# Patient Record
Sex: MALE | Race: ASIAN | NOT HISPANIC OR LATINO | Employment: UNEMPLOYED | URBAN - METROPOLITAN AREA
[De-identification: names, ages, dates, MRNs, and addresses within clinical notes are randomized per-mention and may not be internally consistent; named-entity substitution may affect disease eponyms.]

---

## 2021-07-27 ENCOUNTER — OFFICE VISIT (OUTPATIENT)
Dept: FAMILY MEDICINE | Facility: CLINIC | Age: 51
End: 2021-07-27
Payer: COMMERCIAL

## 2021-07-27 VITALS
RESPIRATION RATE: 14 BRPM | HEIGHT: 66 IN | WEIGHT: 147 LBS | DIASTOLIC BLOOD PRESSURE: 62 MMHG | TEMPERATURE: 96.4 F | SYSTOLIC BLOOD PRESSURE: 112 MMHG | HEART RATE: 82 BPM | OXYGEN SATURATION: 99 % | BODY MASS INDEX: 23.63 KG/M2

## 2021-07-27 DIAGNOSIS — Z12.11 SPECIAL SCREENING FOR MALIGNANT NEOPLASMS, COLON: ICD-10-CM

## 2021-07-27 DIAGNOSIS — Z00.00 HEALTHCARE MAINTENANCE: Primary | ICD-10-CM

## 2021-07-27 DIAGNOSIS — R74.01 TRANSAMINITIS: ICD-10-CM

## 2021-07-27 DIAGNOSIS — Z12.5 SCREENING FOR PROSTATE CANCER: ICD-10-CM

## 2021-07-27 LAB
ERYTHROCYTE [DISTWIDTH] IN BLOOD BY AUTOMATED COUNT: 16.6 % (ref 10–15)
HCT VFR BLD AUTO: 45.3 % (ref 40–53)
HGB BLD-MCNC: 15.3 G/DL (ref 13.3–17.7)
MCH RBC QN AUTO: 22.4 PG (ref 26.5–33)
MCHC RBC AUTO-ENTMCNC: 33.8 G/DL (ref 31.5–36.5)
MCV RBC AUTO: 66 FL (ref 78–100)
PLATELET # BLD AUTO: 247 10E3/UL (ref 150–450)
RBC # BLD AUTO: 6.82 10E6/UL (ref 4.4–5.9)
WBC # BLD AUTO: 8.3 10E3/UL (ref 4–11)

## 2021-07-27 PROCEDURE — 80061 LIPID PANEL: CPT | Performed by: FAMILY MEDICINE

## 2021-07-27 PROCEDURE — 99213 OFFICE O/P EST LOW 20 MIN: CPT | Mod: 25 | Performed by: FAMILY MEDICINE

## 2021-07-27 PROCEDURE — 85027 COMPLETE CBC AUTOMATED: CPT | Performed by: FAMILY MEDICINE

## 2021-07-27 PROCEDURE — 36415 COLL VENOUS BLD VENIPUNCTURE: CPT | Performed by: FAMILY MEDICINE

## 2021-07-27 PROCEDURE — 80053 COMPREHEN METABOLIC PANEL: CPT | Performed by: FAMILY MEDICINE

## 2021-07-27 PROCEDURE — G0103 PSA SCREENING: HCPCS | Performed by: FAMILY MEDICINE

## 2021-07-27 PROCEDURE — 99386 PREV VISIT NEW AGE 40-64: CPT | Performed by: FAMILY MEDICINE

## 2021-07-27 ASSESSMENT — ENCOUNTER SYMPTOMS
HEMATURIA: 0
PARESTHESIAS: 0
HEADACHES: 0
COUGH: 0
PALPITATIONS: 0
WEAKNESS: 0
DIZZINESS: 0
HEMATOCHEZIA: 0
CONSTIPATION: 0
SHORTNESS OF BREATH: 0
NAUSEA: 0
DYSURIA: 0
JOINT SWELLING: 0
EYE PAIN: 0
ABDOMINAL PAIN: 0
MYALGIAS: 0
ARTHRALGIAS: 0
FREQUENCY: 0
FEVER: 0
HEARTBURN: 0
SORE THROAT: 0
CHILLS: 0
NERVOUS/ANXIOUS: 0
DIARRHEA: 0

## 2021-07-27 ASSESSMENT — MIFFLIN-ST. JEOR: SCORE: 1466.79

## 2021-07-27 ASSESSMENT — PAIN SCALES - GENERAL: PAINLEVEL: NO PAIN (0)

## 2021-07-27 NOTE — PROGRESS NOTES
SUBJECTIVE:   CC: Kendall Agosto is an 51 year old male who presents for preventative health visit.       Patient has been advised of split billing requirements and indicates understanding: Yes  Healthy Habits:     Getting at least 3 servings of Calcium per day:  Yes    Bi-annual eye exam:  Yes    Dental care twice a year:  NO    Sleep apnea or symptoms of sleep apnea:  None    Diet:  Other    Frequency of exercise:  2-3 days/week    Duration of exercise:  Less than 15 minutes    Taking medications regularly:  Yes    Medication side effects:  None    PHQ-2 Total Score: 0    Additional concerns today:  No      Today's PHQ-2 Score:   PHQ-2 ( 1999 Pfizer) 7/27/2021   Q1: Little interest or pleasure in doing things 0   Q2: Feeling down, depressed or hopeless 0   PHQ-2 Score 0   Q1: Little interest or pleasure in doing things Not at all   Q2: Feeling down, depressed or hopeless Not at all   PHQ-2 Score 0       Abuse: Current or Past(Physical, Sexual or Emotional)- No  Do you feel safe in your environment? Yes    Have you ever done Advance Care Planning? (For example, a Health Directive, POLST, or a discussion with a medical provider or your loved ones about your wishes): No, advance care planning information given to patient to review.  Patient plans to discuss their wishes with loved ones or provider.      Social History     Tobacco Use     Smoking status: Not on file   Substance Use Topics     Alcohol use: Not on file     If you drink alcohol do you typically have >3 drinks per day or >7 drinks per week? Not applicable    Alcohol Use 7/27/2021   Prescreen: >3 drinks/day or >7 drinks/week? No       Last PSA: No results found for: PSA    Reviewed orders with patient. Reviewed health maintenance and updated orders accordingly - Yes  BP Readings from Last 3 Encounters:   07/27/21 112/62    Wt Readings from Last 3 Encounters:   07/27/21 66.7 kg (147 lb)                  There is no problem list on file for this patient.    No  past surgical history on file.    Social History     Tobacco Use     Smoking status: Current Every Day Smoker     Packs/day: 0.50     Smokeless tobacco: Never Used   Substance Use Topics     Alcohol use: Yes     No family history on file.      No current outpatient medications on file.     No Known Allergies  No lab results found.     Reviewed and updated as needed this visit by clinical staff                 Reviewed and updated as needed this visit by Provider                No past medical history on file.   No past surgical history on file.    Review of Systems   Constitutional: Negative for chills and fever.   HENT: Negative for congestion, ear pain, hearing loss and sore throat.    Eyes: Negative for pain and visual disturbance.   Respiratory: Negative for cough and shortness of breath.    Cardiovascular: Negative for chest pain, palpitations and peripheral edema.   Gastrointestinal: Negative for abdominal pain, constipation, diarrhea, heartburn, hematochezia and nausea.   Genitourinary: Negative for dysuria, frequency, genital sores, hematuria and urgency.   Musculoskeletal: Negative for arthralgias, joint swelling and myalgias.   Skin: Negative for rash.   Neurological: Negative for dizziness, weakness, headaches and paresthesias.   Psychiatric/Behavioral: Negative for mood changes. The patient is not nervous/anxious.      CONSTITUTIONAL: NEGATIVE for fever, chills, change in weight  INTEGUMENTARY/SKIN: NEGATIVE for worrisome rashes, moles or lesions  EYES: NEGATIVE for vision changes or irritation  ENT: NEGATIVE for ear, mouth and throat problems  RESP: NEGATIVE for significant cough or SOB  CV: NEGATIVE for chest pain, palpitations or peripheral edema  GI: NEGATIVE for nausea, abdominal pain, heartburn, or change in bowel habits   male: negative for dysuria, hematuria, decreased urinary stream, erectile dysfunction, urethral discharge  MUSCULOSKELETAL: NEGATIVE for significant arthralgias or  "myalgia  NEURO: NEGATIVE for weakness, dizziness or paresthesias  PSYCHIATRIC: NEGATIVE for changes in mood or affect    OBJECTIVE:   /62   Pulse 82   Temp (!) 96.4  F (35.8  C) (Tympanic)   Resp 14   Ht 1.688 m (5' 6.46\")   Wt 66.7 kg (147 lb)   SpO2 99%   BMI 23.40 kg/m      Physical Exam  GENERAL: healthy, alert and no distress  EYES: Eyes grossly normal to inspection, PERRL and conjunctivae and sclerae normal  HENT: ear canals and TM's normal, nose and mouth without ulcers or lesions  NECK: no adenopathy, no asymmetry, masses, or scars and thyroid normal to palpation  RESP: lungs clear to auscultation - no rales, rhonchi or wheezes  CV: regular rate and rhythm, normal S1 S2, no S3 or S4, no murmur, click or rub, no peripheral edema and peripheral pulses strong  ABDOMEN: soft, nontender, no hepatosplenomegaly, no masses and bowel sounds normal  MS: no gross musculoskeletal defects noted, no edema  SKIN: no suspicious lesions or rashes  NEURO: Normal strength and tone, mentation intact and speech normal  BACK: no CVA tenderness, no paralumbar tenderness  PSYCH: mentation appears normal, affect normal/bright  LYMPH: no cervical, supraclavicular, axillary, or inguinal adenopathy        ASSESSMENT/PLAN:       ICD-10-CM    1. Healthcare maintenance  Z00.00 CBC with platelets     Comprehensive metabolic panel (BMP + Alb, Alk Phos, ALT, AST, Total. Bili, TP)     Lipid panel reflex to direct LDL Fasting     PSA, screen     Adult Gastro Ref - Procedure Only     CBC with platelets     Comprehensive metabolic panel (BMP + Alb, Alk Phos, ALT, AST, Total. Bili, TP)     Lipid panel reflex to direct LDL Fasting     PSA, screen   2. Screening for prostate cancer  Z12.5 PSA, screen     PSA, screen   3. Special screening for malignant neoplasms, colon  Z12.11 Adult Gastro Ref - Procedure Only   4. Transaminitis  R74.01 US Abdomen Complete   his liver enzymes are elevated without particular pathologic clinical finding. " "We will have him to check US for further evaluation     Patient has been advised of split billing requirements and indicates understanding: Yes  COUNSELING:   Reviewed preventive health counseling, as reflected in patient instructions    Estimated body mass index is 23.4 kg/m  as calculated from the following:    Height as of this encounter: 1.688 m (5' 6.46\").    Weight as of this encounter: 66.7 kg (147 lb).         He has no history on file for tobacco use.      Counseling Resources:  ATP IV Guidelines  Pooled Cohorts Equation Calculator  FRAX Risk Assessment  ICSI Preventive Guidelines  Dietary Guidelines for Americans, 2010  USDA's MyPlate  ASA Prophylaxis  Lung CA Screening    Jovan Otero MD  [facility]  "

## 2021-07-28 LAB
ALBUMIN SERPL-MCNC: 4.1 G/DL (ref 3.4–5)
ALP SERPL-CCNC: 103 U/L (ref 40–150)
ALT SERPL W P-5'-P-CCNC: 122 U/L (ref 0–70)
ANION GAP SERPL CALCULATED.3IONS-SCNC: 9 MMOL/L (ref 3–14)
AST SERPL W P-5'-P-CCNC: 46 U/L (ref 0–45)
BILIRUB SERPL-MCNC: 0.5 MG/DL (ref 0.2–1.3)
BUN SERPL-MCNC: 11 MG/DL (ref 7–30)
CALCIUM SERPL-MCNC: 9.6 MG/DL (ref 8.5–10.1)
CHLORIDE BLD-SCNC: 104 MMOL/L (ref 94–109)
CHOLEST SERPL-MCNC: 143 MG/DL
CO2 SERPL-SCNC: 26 MMOL/L (ref 20–32)
CREAT SERPL-MCNC: 0.83 MG/DL (ref 0.66–1.25)
FASTING STATUS PATIENT QL REPORTED: YES
GFR SERPL CREATININE-BSD FRML MDRD: >90 ML/MIN/1.73M2
GLUCOSE BLD-MCNC: 113 MG/DL (ref 70–99)
HDLC SERPL-MCNC: 55 MG/DL
LDLC SERPL CALC-MCNC: 73 MG/DL
NONHDLC SERPL-MCNC: 88 MG/DL
POTASSIUM BLD-SCNC: 4.3 MMOL/L (ref 3.4–5.3)
PROT SERPL-MCNC: 8.1 G/DL (ref 6.8–8.8)
PSA SERPL-MCNC: 0.4 UG/L (ref 0–4)
SODIUM SERPL-SCNC: 139 MMOL/L (ref 133–144)
TRIGL SERPL-MCNC: 77 MG/DL

## 2021-08-09 ENCOUNTER — HOSPITAL ENCOUNTER (OUTPATIENT)
Dept: ULTRASOUND IMAGING | Facility: CLINIC | Age: 51
Discharge: HOME | End: 2021-08-09
Attending: FAMILY MEDICINE | Admitting: FAMILY MEDICINE
Payer: COMMERCIAL

## 2021-08-09 DIAGNOSIS — R74.01 TRANSAMINITIS: ICD-10-CM

## 2021-08-09 PROCEDURE — 76700 US EXAM ABDOM COMPLETE: CPT

## 2021-09-14 ENCOUNTER — APPOINTMENT (OUTPATIENT)
Dept: INTERPRETER SERVICES | Facility: CLINIC | Age: 51
End: 2021-09-14
Payer: COMMERCIAL

## 2021-10-17 ENCOUNTER — HEALTH MAINTENANCE LETTER (OUTPATIENT)
Age: 51
End: 2021-10-17

## 2021-11-13 ENCOUNTER — HOSPITAL ENCOUNTER (EMERGENCY)
Facility: CLINIC | Age: 51
Discharge: HOME | End: 2021-11-13
Attending: EMERGENCY MEDICINE | Admitting: EMERGENCY MEDICINE
Payer: COMMERCIAL

## 2021-11-13 VITALS
DIASTOLIC BLOOD PRESSURE: 76 MMHG | RESPIRATION RATE: 14 BRPM | TEMPERATURE: 98 F | SYSTOLIC BLOOD PRESSURE: 104 MMHG | OXYGEN SATURATION: 99 % | HEART RATE: 77 BPM

## 2021-11-13 DIAGNOSIS — K27.9 PEPTIC ULCER DISEASE: ICD-10-CM

## 2021-11-13 DIAGNOSIS — K21.9 GASTROESOPHAGEAL REFLUX DISEASE WITHOUT ESOPHAGITIS: ICD-10-CM

## 2021-11-13 LAB
ALBUMIN SERPL-MCNC: 3.6 G/DL (ref 3.4–5)
ALP SERPL-CCNC: 117 U/L (ref 40–150)
ALT SERPL W P-5'-P-CCNC: 116 U/L (ref 0–70)
ANION GAP SERPL CALCULATED.3IONS-SCNC: 7 MMOL/L (ref 3–14)
AST SERPL W P-5'-P-CCNC: 39 U/L (ref 0–45)
ATRIAL RATE - MUSE: 78 BPM
BASOPHILS # BLD AUTO: 0 10E3/UL (ref 0–0.2)
BASOPHILS NFR BLD AUTO: 0 %
BILIRUB SERPL-MCNC: 0.4 MG/DL (ref 0.2–1.3)
BUN SERPL-MCNC: 9 MG/DL (ref 7–30)
CALCIUM SERPL-MCNC: 8.9 MG/DL (ref 8.5–10.1)
CHLORIDE BLD-SCNC: 103 MMOL/L (ref 94–109)
CO2 SERPL-SCNC: 27 MMOL/L (ref 20–32)
CREAT SERPL-MCNC: 0.62 MG/DL (ref 0.66–1.25)
DIASTOLIC BLOOD PRESSURE - MUSE: NORMAL MMHG
EOSINOPHIL # BLD AUTO: 0.2 10E3/UL (ref 0–0.7)
EOSINOPHIL NFR BLD AUTO: 2 %
ERYTHROCYTE [DISTWIDTH] IN BLOOD BY AUTOMATED COUNT: 14.9 % (ref 10–15)
GFR SERPL CREATININE-BSD FRML MDRD: >90 ML/MIN/1.73M2
GLUCOSE BLD-MCNC: 151 MG/DL (ref 70–99)
HCT VFR BLD AUTO: 47.5 % (ref 40–53)
HGB BLD-MCNC: 14.7 G/DL (ref 13.3–17.7)
IMM GRANULOCYTES # BLD: 0.1 10E3/UL
IMM GRANULOCYTES NFR BLD: 0 %
INTERPRETATION ECG - MUSE: NORMAL
LIPASE SERPL-CCNC: 111 U/L (ref 73–393)
LYMPHOCYTES # BLD AUTO: 3.6 10E3/UL (ref 0.8–5.3)
LYMPHOCYTES NFR BLD AUTO: 32 %
MCH RBC QN AUTO: 21.3 PG (ref 26.5–33)
MCHC RBC AUTO-ENTMCNC: 30.9 G/DL (ref 31.5–36.5)
MCV RBC AUTO: 69 FL (ref 78–100)
MONOCYTES # BLD AUTO: 1.1 10E3/UL (ref 0–1.3)
MONOCYTES NFR BLD AUTO: 10 %
NEUTROPHILS # BLD AUTO: 6.2 10E3/UL (ref 1.6–8.3)
NEUTROPHILS NFR BLD AUTO: 56 %
NRBC # BLD AUTO: 0 10E3/UL
NRBC BLD AUTO-RTO: 0 /100
P AXIS - MUSE: 67 DEGREES
PLATELET # BLD AUTO: 278 10E3/UL (ref 150–450)
POTASSIUM BLD-SCNC: 3.9 MMOL/L (ref 3.4–5.3)
PR INTERVAL - MUSE: 154 MS
PROT SERPL-MCNC: 8.1 G/DL (ref 6.8–8.8)
QRS DURATION - MUSE: 84 MS
QT - MUSE: 360 MS
QTC - MUSE: 410 MS
R AXIS - MUSE: 62 DEGREES
RBC # BLD AUTO: 6.9 10E6/UL (ref 4.4–5.9)
SODIUM SERPL-SCNC: 137 MMOL/L (ref 133–144)
SYSTOLIC BLOOD PRESSURE - MUSE: NORMAL MMHG
T AXIS - MUSE: 57 DEGREES
TROPONIN I SERPL-MCNC: <0.015 UG/L (ref 0–0.04)
VENTRICULAR RATE- MUSE: 78 BPM
WBC # BLD AUTO: 11.3 10E3/UL (ref 4–11)

## 2021-11-13 PROCEDURE — 96375 TX/PRO/DX INJ NEW DRUG ADDON: CPT

## 2021-11-13 PROCEDURE — 250N000009 HC RX 250: Performed by: EMERGENCY MEDICINE

## 2021-11-13 PROCEDURE — 85025 COMPLETE CBC W/AUTO DIFF WBC: CPT | Performed by: EMERGENCY MEDICINE

## 2021-11-13 PROCEDURE — 93005 ELECTROCARDIOGRAM TRACING: CPT

## 2021-11-13 PROCEDURE — 99284 EMERGENCY DEPT VISIT MOD MDM: CPT | Mod: 25

## 2021-11-13 PROCEDURE — 80053 COMPREHEN METABOLIC PANEL: CPT | Performed by: EMERGENCY MEDICINE

## 2021-11-13 PROCEDURE — 96374 THER/PROPH/DIAG INJ IV PUSH: CPT

## 2021-11-13 PROCEDURE — 36415 COLL VENOUS BLD VENIPUNCTURE: CPT | Performed by: EMERGENCY MEDICINE

## 2021-11-13 PROCEDURE — C9113 INJ PANTOPRAZOLE SODIUM, VIA: HCPCS | Performed by: EMERGENCY MEDICINE

## 2021-11-13 PROCEDURE — 250N000011 HC RX IP 250 OP 636: Performed by: EMERGENCY MEDICINE

## 2021-11-13 PROCEDURE — 84484 ASSAY OF TROPONIN QUANT: CPT | Performed by: EMERGENCY MEDICINE

## 2021-11-13 PROCEDURE — 250N000013 HC RX MED GY IP 250 OP 250 PS 637: Performed by: EMERGENCY MEDICINE

## 2021-11-13 PROCEDURE — 83690 ASSAY OF LIPASE: CPT | Performed by: EMERGENCY MEDICINE

## 2021-11-13 RX ORDER — ONDANSETRON 2 MG/ML
4 INJECTION INTRAMUSCULAR; INTRAVENOUS ONCE
Status: COMPLETED | OUTPATIENT
Start: 2021-11-13 | End: 2021-11-13

## 2021-11-13 RX ORDER — SUCRALFATE 1 G/1
1 TABLET ORAL 2 TIMES DAILY
Qty: 60 TABLET | Refills: 0 | Status: SHIPPED | OUTPATIENT
Start: 2021-11-13 | End: 2021-12-13

## 2021-11-13 RX ORDER — ONDANSETRON 4 MG/1
4 TABLET, ORALLY DISINTEGRATING ORAL EVERY 8 HOURS PRN
Qty: 10 TABLET | Refills: 0 | Status: SHIPPED | OUTPATIENT
Start: 2021-11-13 | End: 2021-11-16

## 2021-11-13 RX ADMIN — ONDANSETRON 4 MG: 2 INJECTION INTRAMUSCULAR; INTRAVENOUS at 14:34

## 2021-11-13 RX ADMIN — LIDOCAINE HYDROCHLORIDE 30 ML: 20 SOLUTION ORAL; TOPICAL at 14:29

## 2021-11-13 RX ADMIN — PANTOPRAZOLE SODIUM 40 MG: 40 INJECTION, POWDER, FOR SOLUTION INTRAVENOUS at 14:34

## 2021-11-13 ASSESSMENT — ENCOUNTER SYMPTOMS
NAUSEA: 1
VOMITING: 1
ABDOMINAL PAIN: 1
FEVER: 0
FLANK PAIN: 0
SORE THROAT: 0
COUGH: 0
DIFFICULTY URINATING: 0
DYSURIA: 0
CHILLS: 0
FATIGUE: 1
SHORTNESS OF BREATH: 0
BACK PAIN: 0

## 2021-11-13 NOTE — ED PROVIDER NOTES
History     Chief Complaint:  Epigastric pain     HPI   Kendall Agosto is a 51 year old male who presents with epigastric pain, nausea and vomiting.  Patient reports for the past 2 weeks he has had epigastric pain that radiates up through his throat especially when he is having feelings of hunger.  He notes that after he eats his pain had somewhat subsides.  He notes some associated episodes of vomiting with these episodes of pain.  Denies any blood in his vomit.  Denies any diarrhea, blood in his stool, urinary symptoms or lower abdominal symptoms.  He has never had any surgeries on his abdomen.  Patient denies significant alcohol use but admits to daily smoking.  Does not use ibuprofen or steroids on a regular basis.  He denies any exertional chest pain, shortness of breath, fever, chills or body aches.    Review of Systems   Constitutional: Positive for fatigue. Negative for chills and fever.   HENT: Negative for sore throat.    Respiratory: Negative for cough and shortness of breath.    Cardiovascular: Negative for chest pain.   Gastrointestinal: Positive for abdominal pain (Gastric), nausea and vomiting.   Genitourinary: Negative for difficulty urinating, dysuria and flank pain.   Musculoskeletal: Negative for back pain.   Skin: Negative for rash.   All other systems reviewed and are negative.    Allergies:  No Known Allergies    Medications:    omeprazole (PRILOSEC) 20 MG DR capsule  ondansetron (ZOFRAN ODT) 4 MG ODT tab  sucralfate (CARAFATE) 1 GM tablet      Past Medical History:    No pertinent past medical history    Past Surgical History:    Denies any pertinent surgical history.    Family History:    Denies any pertinent family history    Social History:  Presents to the emergency department with his wife.    Physical Exam     Patient Vitals for the past 24 hrs:   BP Temp Temp src Pulse Resp SpO2   11/13/21 1555 104/76 -- -- 77 -- 99 %   11/13/21 1305 126/79 98  F (36.7  C) Temporal 89 14 100 %        Physical Exam  General: Patient is awake, alert and interactive when I enter the room. Appears uncomfortable  Head: The scalp, face, and head appear normal  Neck: Normal range of motion. No anterior cervical lymphadenopathy noted  CV: Regular rate.   Resp: No respiratory distress.   GI: Abdomen is soft, no rigidity. No evidence of pulsatile mass. No fluid waves or evidence of ascites. No distension. He is tender to palpation in the epigastrium. There is no rebound or guarding. No hernias or bruising are noted in detailed exam.    MS: Normal tone. Joints grossly normal without effusions. No asymmetric leg swelling, calf or thigh tenderness.    Skin: No rash or lesions noted. Normal capillary refill noted  Neuro:Speech is normal and fluent. Face is symmetric. Moving all extremities.   Psych:  Normal affect.  Appropriate interactions.    Emergency Department Course     ECG:  ECG taken at 1425, ECG read at 1436  Normal sinus rhythm  Normal ECG   Rate 78 bpm. VA interval 154 ms. QRS duration 84 ms. QT/QTc 360/410 ms. P-R-T axes 67 62 57.     Laboratory:  Labs Ordered and Resulted from Time of ED Arrival to Time of ED Departure   COMPREHENSIVE METABOLIC PANEL - Abnormal       Result Value    Sodium 137      Potassium 3.9      Chloride 103      Carbon Dioxide (CO2) 27      Anion Gap 7      Urea Nitrogen 9      Creatinine 0.62 (*)     Calcium 8.9      Glucose 151 (*)     Alkaline Phosphatase 117      AST 39       (*)     Protein Total 8.1      Albumin 3.6      Bilirubin Total 0.4      GFR Estimate >90     CBC WITH PLATELETS AND DIFFERENTIAL - Abnormal    WBC Count 11.3 (*)     RBC Count 6.90 (*)     Hemoglobin 14.7      Hematocrit 47.5      MCV 69 (*)     MCH 21.3 (*)     MCHC 30.9 (*)     RDW 14.9      Platelet Count 278      % Neutrophils 56      % Lymphocytes 32      % Monocytes 10      % Eosinophils 2      % Basophils 0      % Immature Granulocytes 0      NRBCs per 100 WBC 0      Absolute Neutrophils 6.2       Absolute Lymphocytes 3.6      Absolute Monocytes 1.1      Absolute Eosinophils 0.2      Absolute Basophils 0.0      Absolute Immature Granulocytes 0.1 (*)     Absolute NRBCs 0.0     LIPASE - Normal    Lipase 111     TROPONIN I - Normal    Troponin I <0.015         Emergency Department Course:    Reviewed:  I reviewed nursing notes, vitals, past history and care everywhere    Assessments:  1405 I obtained history and examined the patient as noted above.   1545 I rechecked the patient and explained findings.  Patient feeling significantly better.    Interventions:  Medications   lidocaine (XYLOCAINE) 2 % 15 mL, alum & mag hydroxide-simethicone (MAALOX) 15 mL GI Cocktail (30 mLs Oral Given 11/13/21 1429)   ondansetron (ZOFRAN) injection 4 mg (4 mg Intravenous Given 11/13/21 1434)   pantoprazole (PROTONIX) IV push injection 40 mg (40 mg Intravenous Given 11/13/21 1434)     Disposition:  The patient was discharged to home.    Impression & Plan    Medical Decision Making:  Kendall Agosto is a 51 year old male who presents for evaluation of epigastric abdominal pain.  I considered a broad differential diagnosis for this patient including gastritis, GERD, cholecystitis, choledocholithiasis, biliary colic, pancreatitis, colonic or small bowel pathology, vascular etiologies including aneurysm, ulcer.  Signs and symptoms here are consistent with GERD and peptic ulcer disease. Patient experienced relief here with GI cocktail, zofran and protonix.  Patient also had a recent work-up including ultrasound of the abdomen which did not show any evidence of gallstones at that time.  After reviewing the patient's ultrasound and performing abdominal exam, I do not believe that he requires any advanced imaging today. There are no signs of any serious etiologies as mentioned above or intraabdominal catastrophes.  Cardiac equivalent was also considered but given a negative EKG and troponin I feel like this is significantly less likely.  Doubt  perforated ulcer at this point given a reassuring abdominal exam.  Will refer back to primary and do scheduled medication for stomach acid reduction. Consider endoscopy if further symptoms despite this.     Covid-19  Kendall Agosto was evaluated during a global COVID-19 pandemic, which necessitated consideration that the patient might be at risk for infection with the SARS-CoV-2 virus that causes COVID-19.   Applicable protocols for evaluation were followed during the patient's care.     Diagnosis:    ICD-10-CM    1. Gastroesophageal reflux disease without esophagitis  K21.9    2. Peptic ulcer disease  K27.9        Discharge Medications:  Discharge Medication List as of 11/13/2021  3:52 PM      START taking these medications    Details   omeprazole (PRILOSEC) 20 MG DR capsule Take 1 capsule (20 mg) by mouth daily, Disp-30 capsule, R-0, Local Print      ondansetron (ZOFRAN ODT) 4 MG ODT tab Take 1 tablet (4 mg) by mouth every 8 hours as needed for nausea, Disp-10 tablet, R-0, Local Print      sucralfate (CARAFATE) 1 GM tablet Take 1 tablet (1 g) by mouth 2 times daily, Disp-60 tablet, R-0, Local Print             Scribe Disclosure:  I, Coleman Adler, am serving as a scribe at 3:19 PM on 11/13/2021 to document services personally performed by Jamel Garcia MD based on my observations and the provider's statements to me.    MD Radha Ramos Christopher Joseph, MD  11/13/21 [id number]

## 2021-11-13 NOTE — DISCHARGE INSTRUCTIONS
These follow-up with gastroenterology for further evaluation and treatment.  If you have worsening symptoms and cannot wait until follow-up appointment please return to the emergency department.

## 2021-12-10 DIAGNOSIS — K21.00 GASTROESOPHAGEAL REFLUX DISEASE WITH ESOPHAGITIS WITHOUT HEMORRHAGE: Primary | ICD-10-CM

## 2021-12-13 RX ORDER — SUCRALFATE 1 G/1
TABLET ORAL
Qty: 60 TABLET | Refills: 0 | Status: SHIPPED | OUTPATIENT
Start: 2021-12-13 | End: 2022-09-02

## 2021-12-13 NOTE — TELEPHONE ENCOUNTER
Routing refill request to provider for review/approval because:  No diagnosis to link, please review and advise.   Cadence ROMAN RN  [facility]

## 2022-01-11 DIAGNOSIS — K21.00 GASTROESOPHAGEAL REFLUX DISEASE WITH ESOPHAGITIS WITHOUT HEMORRHAGE: ICD-10-CM

## 2022-01-12 NOTE — TELEPHONE ENCOUNTER
Patient was sent My Chart message 12/13/21  to make an appointment with Dr. Otero.  He has read it and there is no appointment scheduled.  Has only been seen X 1, new patient was seen in ED as well.    Needs MD review for further refills. Has NO problems listed.    Please review and refill as appropriate.    Vita Magdaleno RN  [facility]

## 2022-01-13 RX ORDER — SUCRALFATE 1 G/1
TABLET ORAL
Qty: 60 TABLET | Refills: 0 | OUTPATIENT
Start: 2022-01-13

## 2022-09-01 ASSESSMENT — ENCOUNTER SYMPTOMS
SHORTNESS OF BREATH: 0
CHILLS: 0
FREQUENCY: 0
FEVER: 0
SORE THROAT: 0
PARESTHESIAS: 0
COUGH: 0
DYSURIA: 0
MYALGIAS: 0
HEADACHES: 0
EYE PAIN: 0
WEAKNESS: 0
PALPITATIONS: 0
CONSTIPATION: 0
NERVOUS/ANXIOUS: 0
HEMATOCHEZIA: 0
ARTHRALGIAS: 0
HEMATURIA: 0
NAUSEA: 0
ABDOMINAL PAIN: 0
DIZZINESS: 0
HEARTBURN: 0
JOINT SWELLING: 0
DIARRHEA: 0

## 2022-09-02 ENCOUNTER — TELEPHONE (OUTPATIENT)
Dept: GASTROENTEROLOGY | Facility: CLINIC | Age: 52
End: 2022-09-02

## 2022-09-02 ENCOUNTER — OFFICE VISIT (OUTPATIENT)
Dept: FAMILY MEDICINE | Facility: CLINIC | Age: 52
End: 2022-09-02
Payer: COMMERCIAL

## 2022-09-02 VITALS
SYSTOLIC BLOOD PRESSURE: 115 MMHG | HEART RATE: 89 BPM | TEMPERATURE: 97.9 F | OXYGEN SATURATION: 92 % | WEIGHT: 146 LBS | HEIGHT: 66 IN | BODY MASS INDEX: 23.46 KG/M2 | DIASTOLIC BLOOD PRESSURE: 73 MMHG

## 2022-09-02 DIAGNOSIS — Z23 HIGH PRIORITY FOR 2019-NCOV VACCINE: ICD-10-CM

## 2022-09-02 DIAGNOSIS — Z87.891 HISTORY OF SMOKING 30 OR MORE PACK YEARS: ICD-10-CM

## 2022-09-02 DIAGNOSIS — Z00.00 HEALTH MAINTENANCE EXAMINATION: Primary | ICD-10-CM

## 2022-09-02 DIAGNOSIS — Z12.5 SCREENING FOR PROSTATE CANCER: ICD-10-CM

## 2022-09-02 DIAGNOSIS — Z12.11 SCREEN FOR COLON CANCER: ICD-10-CM

## 2022-09-02 DIAGNOSIS — Z12.2 SCREENING FOR LUNG CANCER: ICD-10-CM

## 2022-09-02 LAB
ERYTHROCYTE [DISTWIDTH] IN BLOOD BY AUTOMATED COUNT: 16.6 % (ref 10–15)
HCT VFR BLD AUTO: 47.4 % (ref 40–53)
HGB BLD-MCNC: 15.5 G/DL (ref 13.3–17.7)
MCH RBC QN AUTO: 21.7 PG (ref 26.5–33)
MCHC RBC AUTO-ENTMCNC: 32.7 G/DL (ref 31.5–36.5)
MCV RBC AUTO: 66 FL (ref 78–100)
PLATELET # BLD AUTO: 265 10E3/UL (ref 150–450)
RBC # BLD AUTO: 7.14 10E6/UL (ref 4.4–5.9)
WBC # BLD AUTO: 8.1 10E3/UL (ref 4–11)

## 2022-09-02 PROCEDURE — 80053 COMPREHEN METABOLIC PANEL: CPT | Performed by: FAMILY MEDICINE

## 2022-09-02 PROCEDURE — 99406 BEHAV CHNG SMOKING 3-10 MIN: CPT | Performed by: FAMILY MEDICINE

## 2022-09-02 PROCEDURE — G0103 PSA SCREENING: HCPCS | Performed by: FAMILY MEDICINE

## 2022-09-02 PROCEDURE — 0052A COVID-19,PF,PFIZER (12+ YRS): CPT | Performed by: FAMILY MEDICINE

## 2022-09-02 PROCEDURE — 80061 LIPID PANEL: CPT | Performed by: FAMILY MEDICINE

## 2022-09-02 PROCEDURE — 91305 COVID-19,PF,PFIZER (12+ YRS): CPT | Performed by: FAMILY MEDICINE

## 2022-09-02 PROCEDURE — 85027 COMPLETE CBC AUTOMATED: CPT | Performed by: FAMILY MEDICINE

## 2022-09-02 PROCEDURE — 36415 COLL VENOUS BLD VENIPUNCTURE: CPT | Performed by: FAMILY MEDICINE

## 2022-09-02 ASSESSMENT — ENCOUNTER SYMPTOMS
WEAKNESS: 0
MYALGIAS: 0
PALPITATIONS: 0
JOINT SWELLING: 0
ARTHRALGIAS: 0
NAUSEA: 0
HEARTBURN: 0
FREQUENCY: 0
SHORTNESS OF BREATH: 0
COUGH: 0
HEMATURIA: 0
ABDOMINAL PAIN: 0
DIZZINESS: 0
HEADACHES: 0
NERVOUS/ANXIOUS: 0
HEMATOCHEZIA: 0
CONSTIPATION: 0
DYSURIA: 0
FEVER: 0
CHILLS: 0
SORE THROAT: 0
PARESTHESIAS: 0
DIARRHEA: 0
EYE PAIN: 0

## 2022-09-02 ASSESSMENT — PAIN SCALES - GENERAL: PAINLEVEL: NO PAIN (0)

## 2022-09-02 NOTE — PROGRESS NOTES
SUBJECTIVE:   CC: Kendall Agosto is an 52 year old male who presents for preventative health visit.       Patient has been advised of split billing requirements and indicates understanding: Yes  Healthy Habits:     Getting at least 3 servings of Calcium per day:  Yes    Bi-annual eye exam:  Yes    Dental care twice a year:  NO    Sleep apnea or symptoms of sleep apnea:  None    Diet:  Regular (no restrictions)    Frequency of exercise:  2-3 days/week    Duration of exercise:  Less than 15 minutes    Taking medications regularly:  Yes    Medication side effects:  None    PHQ-2 Total Score: 0    Additional concerns today:  No      Today's PHQ-2 Score:   PHQ-2 ( 1999 Pfizer) 9/1/2022   Q1: Little interest or pleasure in doing things 0   Q2: Feeling down, depressed or hopeless 0   PHQ-2 Score 0   PHQ-2 Total Score (12-17 Years)- Positive if 3 or more points; Administer PHQ-A if positive -   Q1: Little interest or pleasure in doing things Not at all   Q2: Feeling down, depressed or hopeless Not at all   PHQ-2 Score 0       Abuse: Current or Past(Physical, Sexual or Emotional)- No  Do you feel safe in your environment? Yes        Social History     Tobacco Use     Smoking status: Current Every Day Smoker     Packs/day: 0.50     Smokeless tobacco: Never Used   Substance Use Topics     Alcohol use: Yes     If you drink alcohol do you typically have >3 drinks per day or >7 drinks per week? No    Alcohol Use 9/1/2022   Prescreen: >3 drinks/day or >7 drinks/week? No   Prescreen: >3 drinks/day or >7 drinks/week? -   No flowsheet data found.    Last PSA:   Prostate Specific Antigen Screen   Date Value Ref Range Status   07/27/2021 0.40 0.00 - 4.00 ug/L Final       Reviewed orders with patient. Reviewed health maintenance and updated orders accordingly - Yes  BP Readings from Last 3 Encounters:   09/02/22 115/73   11/13/21 104/76   07/27/21 112/62    Wt Readings from Last 3 Encounters:   09/02/22 66.2 kg (146 lb)   07/27/21 66.7 kg  "(147 lb)                  There is no problem list on file for this patient.    No past surgical history on file.    Social History     Tobacco Use     Smoking status: Current Every Day Smoker     Packs/day: 0.50     Smokeless tobacco: Never Used   Substance Use Topics     Alcohol use: Yes     No family history on file.      No current outpatient medications on file.     No Known Allergies  Recent Labs   Lab Test 11/13/21  1422 07/27/21  1111   LDL  --  73   HDL  --  55   TRIG  --  77   * 122*   CR 0.62* 0.83   GFRESTIMATED >90 >90   POTASSIUM 3.9 4.3        Reviewed and updated as needed this visit by clinical staff                    Reviewed and updated as needed this visit by Provider                   No past medical history on file.   No past surgical history on file.    Review of Systems   Constitutional: Negative for chills and fever.   HENT: Negative for congestion, ear pain, hearing loss and sore throat.    Eyes: Negative for pain and visual disturbance.   Respiratory: Negative for cough and shortness of breath.    Cardiovascular: Negative for chest pain, palpitations and peripheral edema.   Gastrointestinal: Negative for abdominal pain, constipation, diarrhea, heartburn, hematochezia and nausea.   Genitourinary: Negative for dysuria, frequency, genital sores, hematuria, impotence, penile discharge and urgency.   Musculoskeletal: Negative for arthralgias, joint swelling and myalgias.   Skin: Negative for rash.   Neurological: Negative for dizziness, weakness, headaches and paresthesias.   Psychiatric/Behavioral: Negative for mood changes. The patient is not nervous/anxious.          OBJECTIVE:   /73   Pulse 89   Temp 97.9  F (36.6  C) (Temporal)   Ht 1.676 m (5' 6\")   Wt 66.2 kg (146 lb)   SpO2 92%   BMI 23.57 kg/m      Physical Exam  GENERAL: healthy, alert and no distress  NECK: no adenopathy, no asymmetry, masses, or scars and thyroid normal to palpation  RESP: lungs clear to " "auscultation - no rales, rhonchi or wheezes  CV: regular rate and rhythm, normal S1 S2, no S3 or S4, no murmur, click or rub, no peripheral edema and peripheral pulses strong  ABDOMEN: soft, nontender, no hepatosplenomegaly, no masses and bowel sounds normal  MS: no gross musculoskeletal defects noted, no edema        ASSESSMENT/PLAN:       ICD-10-CM    1. Health maintenance examination  Z00.00 Colonoscopy Screening  Referral     CBC with platelets     Comprehensive metabolic panel (BMP + Alb, Alk Phos, ALT, AST, Total. Bili, TP)     Lipid panel reflex to direct LDL Fasting     PSA, screen   2. Screen for colon cancer  Z12.11 Colonoscopy Screening  Referral   3. Screening for prostate cancer  Z12.5 PSA, screen   4. Screening for lung cancer  Z12.2 CT Chest Lung Cancer Scrn Low Dose wo   5. History of smoking 30 or more pack years  Z87.891 CT Chest Lung Cancer Scrn Low Dose wo       Patient has been advised of split billing requirements and indicates understanding: Yes    COUNSELING:   Reviewed preventive health counseling, as reflected in patient instructions    Estimated body mass index is 23.4 kg/m  as calculated from the following:    Height as of 7/27/21: 1.688 m (5' 6.46\").    Weight as of 7/27/21: 66.7 kg (147 lb).         He reports that he has been smoking. He has been smoking about 0.50 packs per day. He has never used smokeless tobacco.  Nicotine/Tobacco Cessation Plan:   Self help information given to patient      Counseling Resources:  ATP IV Guidelines  Pooled Cohorts Equation Calculator  FRAX Risk Assessment  ICSI Preventive Guidelines  Dietary Guidelines for Americans, 2010  [employer]'s MyPlate  ASA Prophylaxis  Lung CA Screening    Jovan Otero MD  [facility]  "

## 2022-09-02 NOTE — TELEPHONE ENCOUNTER
Screening Questions    BlueKIND OF PREP RedLOCATION [review exclusion criteria] GreenSEDATION TYPE      1. Are you active on mychart? Y     2. What insurance is in the chart? [employer]      3.   Ordering/Referring Provider: Jovan Otero MD      4. BMI   (If greater than 40 review exclusion criteria [PAC APPT IF [MAC] @ U)  23.6  [If yes, BMI OVER 40-EXTENDED PREP]      **(Sedation review/consideration needed)**  Do you have a legal guardian or Medical Power of    and/or are you able to give consent for your medical care?     SELF     5. Have you had a positive covid test in the last 90 days?   N     6.  Are you currently on dialysis?   N [ If yes, G-PREP & HOSPITAL setting ONLY]     7.  Do you have chronic kidney disease?  N [ If yes, G-PREP ]    8.   Do you have a diagnosis of diabetes?   N   [ If yes, G-PREP ]    9.  On a regular basis do you go 3-5 days between bowel movements?   N   [ If yes, EXTENDED PREP]    10.  Are you taking any prescription pain medications on a routine schedule?    N  [ If yes, EXTENDED PREP] [If yes, MAC]      11.   Do you have any chemical dependencies such as alcohol, street drugs, or methadone?    N [If yes, MAC]    12.   Do you have any history of post-traumatic stress syndrome, severe anxiety or history of psychosis?    N  [If yes, MAC]    13.  [FEMALES] Are you currently pregnant?     If yes, how many weeks?       Respiratory/Heart Screening:  [If yes to any of the following HOSPITAL setting only]     14. Do you have Pulmonary Hypertension [Lungs]?   N       15. Do you have UNCONTROLLED asthma?   N     16.  Do you use daily home oxygen?  N      17. Do you have mod to severe Obstructive Sleep Apnea?         (OKAY @  [facility]  SH  PH  RI  MG - if pt is not on OXYGEN)  N      18.   Have you had a heart or lung transplant?   N      19.   Have you had a stroke or Transient ischemic attack (TIA - aka  mini stroke ) within 6 months?  (If yes, please review exclusion criteria)  N      20.   In the past 6 months, have you had any heart related issues including cardiomyopathy or heart attack?   N           If yes, did it require cardiac stenting or other implantable device?   N      21.   Do you have any implantable devices in your body (pacemaker, defib, LVAD)? (If yes, please review exclusion criteria)  N   22.  Do you take the medication Phentermine?  NO        23. Do you take nitroglycerin?   N           If yes, how often? N  (if yes, HOSPITAL setting ONLY)    24.  Are you currently taking any blood thinners?    [If yes, INFORM patient to follw up w/ ORDERING PROVIDER FOR BRIDGING INSTRUCTIONS]     N    25.   Do you transfer independently?                (If NO, please HOSPITAL setting ONLY)  Y    26.   Preferred LOCAL Pharmacy for Pre Prescription:           [id number] - [place], [place] - [address] AT [facility] [address] [place]    Scheduling Details  (Please ask for phone number if not scheduled by patient)      Caller : Kendall Agosto   Date of Procedure: 10/06/2022  Surgeon: LIVAN   Location: [place]         Sedation Type: MOD  l PER PROTCOL   Conscious Sedation- Needs  for 6 hours after the procedure  MAC/General-Needs  for 24 hours after procedure    N :[Pre-op Required] at [facility]  SH  MG and OR for MAC sedation   (advise patient they will need a pre-op WITH IN 30 DAYS of procedure date)     Type of Procedure Scheduled:   Lower Endoscopy [Colonoscopy]    Which Colonoscopy Prep was Sent?:   S/G  - PER PROTOCOL     DAVID CF PATIENTS & GROEN'S PATIENTS NEEDS EXTENDED PREP       Informed patient they will need an adult  Y  Cannot take any type of public or medical transportation alone    Pre-Procedure Covid test to be completed at [facility] or Externally: Y  **INFORMED OF HOME TESTING & LAB OPTION**        Confirmed Nurse will call to complete assessment Y    Additional comments: N

## 2022-09-03 LAB
ALBUMIN SERPL-MCNC: 4 G/DL (ref 3.4–5)
ALP SERPL-CCNC: 114 U/L (ref 40–150)
ALT SERPL W P-5'-P-CCNC: 46 U/L (ref 0–70)
ANION GAP SERPL CALCULATED.3IONS-SCNC: 8 MMOL/L (ref 3–14)
AST SERPL W P-5'-P-CCNC: 25 U/L (ref 0–45)
BILIRUB SERPL-MCNC: 0.4 MG/DL (ref 0.2–1.3)
BUN SERPL-MCNC: 10 MG/DL (ref 7–30)
CALCIUM SERPL-MCNC: 9.4 MG/DL (ref 8.5–10.1)
CHLORIDE BLD-SCNC: 103 MMOL/L (ref 94–109)
CHOLEST SERPL-MCNC: 160 MG/DL
CO2 SERPL-SCNC: 26 MMOL/L (ref 20–32)
CREAT SERPL-MCNC: 0.78 MG/DL (ref 0.66–1.25)
FASTING STATUS PATIENT QL REPORTED: YES
GFR SERPL CREATININE-BSD FRML MDRD: >90 ML/MIN/1.73M2
GLUCOSE BLD-MCNC: 154 MG/DL (ref 70–99)
HDLC SERPL-MCNC: 39 MG/DL
LDLC SERPL CALC-MCNC: 95 MG/DL
NONHDLC SERPL-MCNC: 121 MG/DL
POTASSIUM BLD-SCNC: 4.1 MMOL/L (ref 3.4–5.3)
PROT SERPL-MCNC: 8.2 G/DL (ref 6.8–8.8)
PSA SERPL-MCNC: 0.44 UG/L (ref 0–4)
SODIUM SERPL-SCNC: 137 MMOL/L (ref 133–144)
TRIGL SERPL-MCNC: 130 MG/DL

## 2022-10-03 ENCOUNTER — HEALTH MAINTENANCE LETTER (OUTPATIENT)
Age: 52
End: 2022-10-03

## 2022-10-06 ENCOUNTER — HOSPITAL ENCOUNTER (OUTPATIENT)
Facility: CLINIC | Age: 52
Discharge: HOME | End: 2022-10-06
Attending: SPECIALIST | Admitting: SPECIALIST
Payer: COMMERCIAL

## 2022-10-06 VITALS
HEART RATE: 91 BPM | RESPIRATION RATE: 21 BRPM | SYSTOLIC BLOOD PRESSURE: 97 MMHG | DIASTOLIC BLOOD PRESSURE: 71 MMHG | OXYGEN SATURATION: 99 %

## 2022-10-06 DIAGNOSIS — Z12.11 ENCOUNTER FOR SCREENING COLONOSCOPY: Primary | ICD-10-CM

## 2022-10-06 LAB — COLONOSCOPY: NORMAL

## 2022-10-06 PROCEDURE — 45380 COLONOSCOPY AND BIOPSY: CPT | Mod: PT | Performed by: SPECIALIST

## 2022-10-06 PROCEDURE — 88305 TISSUE EXAM BY PATHOLOGIST: CPT | Mod: TC | Performed by: SPECIALIST

## 2022-10-06 PROCEDURE — 250N000011 HC RX IP 250 OP 636: Performed by: SPECIALIST

## 2022-10-06 PROCEDURE — G0500 MOD SEDAT ENDO SERVICE >5YRS: HCPCS | Performed by: SPECIALIST

## 2022-10-06 PROCEDURE — 88305 TISSUE EXAM BY PATHOLOGIST: CPT | Mod: 26 | Performed by: PATHOLOGY

## 2022-10-06 RX ORDER — FENTANYL CITRATE 50 UG/ML
INJECTION, SOLUTION INTRAMUSCULAR; INTRAVENOUS PRN
Status: COMPLETED | OUTPATIENT
Start: 2022-10-06 | End: 2022-10-06

## 2022-10-06 RX ORDER — ONDANSETRON 2 MG/ML
4 INJECTION INTRAMUSCULAR; INTRAVENOUS
Status: DISCONTINUED | OUTPATIENT
Start: 2022-10-06 | End: 2022-10-06 | Stop reason: HOSPADM

## 2022-10-06 RX ORDER — LIDOCAINE 40 MG/G
CREAM TOPICAL
Status: DISCONTINUED | OUTPATIENT
Start: 2022-10-06 | End: 2022-10-06 | Stop reason: HOSPADM

## 2022-10-06 RX ADMIN — FENTANYL CITRATE 50 MCG: 50 INJECTION, SOLUTION INTRAMUSCULAR; INTRAVENOUS at 09:54

## 2022-10-06 RX ADMIN — MIDAZOLAM 1 MG: 1 INJECTION INTRAMUSCULAR; INTRAVENOUS at 09:55

## 2022-10-06 RX ADMIN — FENTANYL CITRATE 50 MCG: 50 INJECTION, SOLUTION INTRAMUSCULAR; INTRAVENOUS at 09:56

## 2022-10-06 RX ADMIN — MIDAZOLAM 1 MG: 1 INJECTION INTRAMUSCULAR; INTRAVENOUS at 09:56

## 2022-10-06 ASSESSMENT — ACTIVITIES OF DAILY LIVING (ADL): ADLS_ACUITY_SCORE: 33

## 2022-10-06 NOTE — H&P
"Pre-Endoscopy History and Physical     Kendall Agosto MRN# [id number]   YOB: 1970 Age: 52 year old     Date of Procedure: 10/6/2022  Primary care provider: Jovan Otero  Type of Endoscopy: colonoscopy  Reason for Procedure: screening  Type of Anesthesia Anticipated: Moderate sedation    HPI:    Gaudencio is a 52 year old male who will be undergoing the above procedure.      A history and physical has been performed. The patient's medications and allergies have been reviewed. The risks and benefits of the procedure and the sedation options and risks were discussed with the patient.  All questions were answered and informed consent was obtained.      He denies a personal or family history of anesthesia complications or bleeding disorders.     No Known Allergies     No current facility-administered medications for this encounter.       There is no problem list on file for this patient.       History reviewed. No pertinent past medical history.     History reviewed. No pertinent surgical history.    Social History     Tobacco Use     Smoking status: Current Every Day Smoker     Packs/day: 0.30     Years: 30.00     Pack years: 9.00     Types: Cigarettes     Smokeless tobacco: Never Used   Substance Use Topics     Alcohol use: Yes     Comment: very minimal use       History reviewed. No pertinent family history.    REVIEW OF SYSTEMS:   5 point ROS negative except as noted above in HPI, including Gen., Resp., CV, GI &  system review.    PHYSICAL EXAM:   /81   Pulse 80   Resp 16   SpO2 99%  Estimated body mass index is 23.57 kg/m  as calculated from the following:    Height as of 9/2/22: 1.676 m (5' 6\").    Weight as of 9/2/22: 66.2 kg (146 lb).   GENERAL APPEARANCE: healthy  MENTAL STATUS: alert  AIRWAY EXAM: Mallampatti Class II (visualization of the soft palate, fauces, and uvula)  RESP: lungs clear to auscultation - no rales, rhonchi or wheezes  CV: regular rates and rhythm, normal S1 S2, no S3 or S4 and no " murmur, click or rub    DIAGNOSTICS:    Not indicated    IMPRESSION   ASA Class 1 - Healthy patient, no medical problems    PLAN:   Colonoscopy    The above has been forwarded to the consulting provider.      Signed Electronically by: Amrit Reyna MD  October 6, 2022

## 2022-10-09 LAB
PATH REPORT.COMMENTS IMP SPEC: NORMAL
PATH REPORT.COMMENTS IMP SPEC: NORMAL
PATH REPORT.FINAL DX SPEC: NORMAL
PATH REPORT.GROSS SPEC: NORMAL
PATH REPORT.MICROSCOPIC SPEC OTHER STN: NORMAL
PATH REPORT.RELEVANT HX SPEC: NORMAL
PHOTO IMAGE: NORMAL

## 2022-10-12 ENCOUNTER — HOSPITAL ENCOUNTER (EMERGENCY)
Facility: CLINIC | Age: 52
Discharge: HOME | End: 2022-10-12
Attending: EMERGENCY MEDICINE | Admitting: EMERGENCY MEDICINE
Payer: COMMERCIAL

## 2022-10-12 ENCOUNTER — APPOINTMENT (OUTPATIENT)
Dept: CT IMAGING | Facility: CLINIC | Age: 52
End: 2022-10-12
Attending: EMERGENCY MEDICINE
Payer: COMMERCIAL

## 2022-10-12 VITALS
DIASTOLIC BLOOD PRESSURE: 80 MMHG | HEART RATE: 85 BPM | RESPIRATION RATE: 16 BRPM | SYSTOLIC BLOOD PRESSURE: 122 MMHG | OXYGEN SATURATION: 99 % | TEMPERATURE: 98.4 F

## 2022-10-12 DIAGNOSIS — K29.60 OTHER GASTRITIS WITHOUT HEMORRHAGE, UNSPECIFIED CHRONICITY: ICD-10-CM

## 2022-10-12 DIAGNOSIS — K21.9 GASTROESOPHAGEAL REFLUX DISEASE, UNSPECIFIED WHETHER ESOPHAGITIS PRESENT: ICD-10-CM

## 2022-10-12 LAB
ALBUMIN SERPL-MCNC: 3.6 G/DL (ref 3.4–5)
ALP SERPL-CCNC: 137 U/L (ref 40–150)
ALT SERPL W P-5'-P-CCNC: 82 U/L (ref 0–70)
ANION GAP SERPL CALCULATED.3IONS-SCNC: 3 MMOL/L (ref 3–14)
AST SERPL W P-5'-P-CCNC: 55 U/L (ref 0–45)
BASOPHILS # BLD AUTO: 0 10E3/UL (ref 0–0.2)
BASOPHILS NFR BLD AUTO: 0 %
BILIRUB SERPL-MCNC: 0.5 MG/DL (ref 0.2–1.3)
BUN SERPL-MCNC: 7 MG/DL (ref 7–30)
CALCIUM SERPL-MCNC: 9.3 MG/DL (ref 8.5–10.1)
CHLORIDE BLD-SCNC: 99 MMOL/L (ref 94–109)
CO2 SERPL-SCNC: 29 MMOL/L (ref 20–32)
CREAT SERPL-MCNC: 0.6 MG/DL (ref 0.66–1.25)
EOSINOPHIL # BLD AUTO: 0.3 10E3/UL (ref 0–0.7)
EOSINOPHIL NFR BLD AUTO: 3 %
ERYTHROCYTE [DISTWIDTH] IN BLOOD BY AUTOMATED COUNT: 15 % (ref 10–15)
GFR SERPL CREATININE-BSD FRML MDRD: >90 ML/MIN/1.73M2
GLUCOSE BLD-MCNC: 156 MG/DL (ref 70–99)
HCT VFR BLD AUTO: 48.9 % (ref 40–53)
HGB BLD-MCNC: 14.9 G/DL (ref 13.3–17.7)
IMM GRANULOCYTES # BLD: 0 10E3/UL
IMM GRANULOCYTES NFR BLD: 0 %
LIPASE SERPL-CCNC: 277 U/L (ref 73–393)
LYMPHOCYTES # BLD AUTO: 2.7 10E3/UL (ref 0.8–5.3)
LYMPHOCYTES NFR BLD AUTO: 26 %
MCH RBC QN AUTO: 21.1 PG (ref 26.5–33)
MCHC RBC AUTO-ENTMCNC: 30.5 G/DL (ref 31.5–36.5)
MCV RBC AUTO: 69 FL (ref 78–100)
MONOCYTES # BLD AUTO: 0.8 10E3/UL (ref 0–1.3)
MONOCYTES NFR BLD AUTO: 8 %
NEUTROPHILS # BLD AUTO: 6.8 10E3/UL (ref 1.6–8.3)
NEUTROPHILS NFR BLD AUTO: 63 %
NRBC # BLD AUTO: 0 10E3/UL
NRBC BLD AUTO-RTO: 0 /100
PLATELET # BLD AUTO: 284 10E3/UL (ref 150–450)
POTASSIUM BLD-SCNC: 4.6 MMOL/L (ref 3.4–5.3)
PROT SERPL-MCNC: 8.6 G/DL (ref 6.8–8.8)
RBC # BLD AUTO: 7.06 10E6/UL (ref 4.4–5.9)
SODIUM SERPL-SCNC: 131 MMOL/L (ref 133–144)
WBC # BLD AUTO: 10.7 10E3/UL (ref 4–11)

## 2022-10-12 PROCEDURE — 250N000009 HC RX 250: Performed by: EMERGENCY MEDICINE

## 2022-10-12 PROCEDURE — 80053 COMPREHEN METABOLIC PANEL: CPT | Performed by: EMERGENCY MEDICINE

## 2022-10-12 PROCEDURE — 85025 COMPLETE CBC W/AUTO DIFF WBC: CPT | Performed by: EMERGENCY MEDICINE

## 2022-10-12 PROCEDURE — 82040 ASSAY OF SERUM ALBUMIN: CPT | Performed by: EMERGENCY MEDICINE

## 2022-10-12 PROCEDURE — 83690 ASSAY OF LIPASE: CPT | Performed by: EMERGENCY MEDICINE

## 2022-10-12 PROCEDURE — 99285 EMERGENCY DEPT VISIT HI MDM: CPT | Mod: 25

## 2022-10-12 PROCEDURE — 250N000011 HC RX IP 250 OP 636: Performed by: EMERGENCY MEDICINE

## 2022-10-12 PROCEDURE — 74177 CT ABD & PELVIS W/CONTRAST: CPT

## 2022-10-12 PROCEDURE — 36415 COLL VENOUS BLD VENIPUNCTURE: CPT | Performed by: EMERGENCY MEDICINE

## 2022-10-12 RX ORDER — ONDANSETRON 4 MG/1
4 TABLET, ORALLY DISINTEGRATING ORAL EVERY 8 HOURS PRN
Qty: 10 TABLET | Refills: 0 | Status: SHIPPED | OUTPATIENT
Start: 2022-10-12

## 2022-10-12 RX ORDER — OMEPRAZOLE 40 MG/1
40 CAPSULE, DELAYED RELEASE ORAL DAILY
Qty: 14 CAPSULE | Refills: 0 | Status: SHIPPED | OUTPATIENT
Start: 2022-10-12 | End: 2022-10-12

## 2022-10-12 RX ORDER — SUCRALFATE 1 G/1
1 TABLET ORAL 4 TIMES DAILY
Qty: 56 TABLET | Refills: 0 | Status: SHIPPED | OUTPATIENT
Start: 2022-10-12 | End: 2022-10-12

## 2022-10-12 RX ORDER — OMEPRAZOLE 40 MG/1
40 CAPSULE, DELAYED RELEASE ORAL DAILY
Qty: 14 CAPSULE | Refills: 0 | Status: SHIPPED | OUTPATIENT
Start: 2022-10-12

## 2022-10-12 RX ORDER — IOPAMIDOL 755 MG/ML
73 INJECTION, SOLUTION INTRAVASCULAR ONCE
Status: COMPLETED | OUTPATIENT
Start: 2022-10-12 | End: 2022-10-12

## 2022-10-12 RX ORDER — ONDANSETRON 4 MG/1
4 TABLET, ORALLY DISINTEGRATING ORAL EVERY 8 HOURS PRN
Qty: 10 TABLET | Refills: 0 | Status: SHIPPED | OUTPATIENT
Start: 2022-10-12 | End: 2022-10-12

## 2022-10-12 RX ORDER — SUCRALFATE 1 G/1
1 TABLET ORAL 4 TIMES DAILY
Qty: 56 TABLET | Refills: 0 | Status: SHIPPED | OUTPATIENT
Start: 2022-10-12 | End: 2023-03-06

## 2022-10-12 RX ADMIN — IOPAMIDOL 73 ML: 755 INJECTION, SOLUTION INTRAVENOUS at 15:06

## 2022-10-12 RX ADMIN — SODIUM CHLORIDE 61 ML: 900 INJECTION INTRAVENOUS at 15:06

## 2022-10-12 ASSESSMENT — ENCOUNTER SYMPTOMS
ABDOMINAL PAIN: 1
VOMITING: 1
UNEXPECTED WEIGHT CHANGE: 1

## 2022-10-12 NOTE — ED PROVIDER NOTES
"Rapid Assessment Note    History:   Kendall Agosto is a Georgian-speaking 52 year old male who presents with upper abdominal pain. Patient's spouse reports the patient has had epigastric pain intermittently for \"a while\", but notices gotten worse over the last several weeks. Pain is exacerbated by eating. Patient endorses weight loss to be spitting up or vomiting food after some meals    No diarrhea, no fever    Exam:   General:  Alert, interactive  Cardiovascular:  Well perfused  Lungs:  No respiratory distress, no accessory muscle use  Neuro:  Moving all 4 extremities  Abd-does have epigastric tenderness to palpation, no guarding or rebound, lower abdominal quadrants  Skin:  Warm, dry  Psych:  Normal affect    Labs and CT    Plan of Care:   I evaluated the patient and developed an initial plan of care. I discussed this plan and explained that I, or one of my partners, would be returning to complete the evaluation.     I, JESSICA ARMSTRONG, am serving as a scribe to document services personally performed by Albert Coyle MD, based on my observations and the provider's statements to me.    10/12/2022  [facility]    Portions of this medical record were completed by a scribe. UPON MY REVIEW AND AUTHENTICATION BY ELECTRONIC SIGNATURE, this confirms (a) I performed the applicable clinical services, and (b) the record is accurate.        Nayan Coyle MD  10/12/22 2404    "

## 2022-10-12 NOTE — ED TRIAGE NOTES
Upper abdominal pain 1 week. Unable to eat. Having weight loss. Afebrile. Was hospitalized 1 week ago for similar symptoms. No previous abdominal surgeries.      Triage Assessment     Row Name 10/12/22 9855       Triage Assessment (Adult)    Airway WDL WDL       Respiratory WDL    Respiratory WDL WDL       Skin Circulation/Temperature WDL    Skin Circulation/Temperature WDL WDL       Cardiac WDL    Cardiac WDL WDL       Peripheral/Neurovascular WDL    Peripheral Neurovascular WDL WDL       Cognitive/Neuro/Behavioral WDL    Cognitive/Neuro/Behavioral WDL WDL

## 2022-10-12 NOTE — ED PROVIDER NOTES
"  History   Chief Complaint:  Abdominal Pain       The history is provided by a relative and the patient.      Kendall Agosto is a Italian-speaking 52 year old male who presents with upper abdominal pain. Patient's spouse reports the patient has had epigastric pain intermittently for \"a while\", but notices gotten worse over the last several weeks. Pain is exacerbated by eating. Patient endorses weight loss to be spitting up or vomiting food after some meals.     Specifically wife notes that he has had reflux before, and has been seen before and she medications that help but is not taking any current medications now.  No stool changes, stools normal appearing.  Of note patient was also seen and had a routine colonoscopy done several days ago, but family states that this was for a separate issue and was for screening only, it does not sound that he has anyone prescribing medications for his gastritis although for my review of the chart it does seem like he had this in the past.        Review of Systems   Constitutional: Positive for unexpected weight change.   Gastrointestinal: Positive for abdominal pain and vomiting.   All other systems reviewed and are negative.      Allergies:  No Known Allergies    Medications:  None.    Past Medical History:     None      Past Surgical History:    Colonoscopy     Social History:  The patient presents to the ED with spouse  PCP: Jovan Otero     Physical Exam     Patient Vitals for the past 24 hrs:   BP Temp Temp src Pulse Resp SpO2   10/12/22 1345 137/79 98.4  F (36.9  C) Temporal 90 16 100 %       Physical Exam  Vitals: reviewed by me  General: Pt seen on [facility], pleasant, cooperative, and alert to conversation, well-nourished and well-appearing  Eyes: Tracking well, clear conjunctiva BL, not gaunt  ENT: MMM, midline trachea.   Lungs: No tachypnea, no accessory muscle use. No respiratory distress.   CV: Rate as above  Abd: Soft, does have epigastric tenderness palpation " but no guarding or rebound.  Other quadrants are unremarkable without tenderness.  MSK: no joint effusion.  No evidence of trauma  Skin: No rash  Neuro: Clear speech and no facial droop.  Psych: Not RIS, no e/o AH/VH      Emergency Department Course     Imaging:  CT Abdomen Pelvis w Contrast   Final Result   IMPRESSION: Gastric wall thickening most prominent along the lesser   curvature, with several outpouchings of the gastric lumen extending   into the thickened gastric wall. Findings are suspicious for gastric   ulcer disease. Endoscopy is recommended for further evaluation.      JIMMY NYAAK MD            SYSTEM ID:  [id number]        Report per radiology    Laboratory:  Labs Ordered and Resulted from Time of ED Arrival to Time of ED Departure   COMPREHENSIVE METABOLIC PANEL - Abnormal       Result Value    Sodium 131 (*)     Potassium 4.6      Chloride 99      Carbon Dioxide (CO2) 29      Anion Gap 3      Urea Nitrogen 7      Creatinine 0.60 (*)     Calcium 9.3      Glucose 156 (*)     Alkaline Phosphatase 137      AST 55 (*)     ALT 82 (*)     Protein Total 8.6      Albumin 3.6      Bilirubin Total 0.5      GFR Estimate >90     CBC WITH PLATELETS AND DIFFERENTIAL - Abnormal    WBC Count 10.7      RBC Count 7.06 (*)     Hemoglobin 14.9      Hematocrit 48.9      MCV 69 (*)     MCH 21.1 (*)     MCHC 30.5 (*)     RDW 15.0      Platelet Count 284      % Neutrophils 63      % Lymphocytes 26      % Monocytes 8      % Eosinophils 3      % Basophils 0      % Immature Granulocytes 0      NRBCs per 100 WBC 0      Absolute Neutrophils 6.8      Absolute Lymphocytes 2.7      Absolute Monocytes 0.8      Absolute Eosinophils 0.3      Absolute Basophils 0.0      Absolute Immature Granulocytes 0.0      Absolute NRBCs 0.0     LIPASE - Normal    Lipase 277        Emergency Department Course:    Reviewed:  I reviewed nursing notes, vitals, past medical history and Care Everywhere    Assessments:  ED Course as of 10/12/22 4754    Wed Oct 12, 2022   1342 I obtained history in triage and briefly examined the patient.    1651 I rechecked the patient and explained findings.      Interventions:  Medications   Saline flush (61 mLs Intravenous Given 10/12/22 1506)   iopamidol (ISOVUE-370) solution 73 mL (73 mLs Intravenous Given 10/12/22 1506)     Disposition:  The patient was discharged to home.     Impression & Plan     CMS Diagnoses: None    Medical Decision Making:  This is a very pleasant 82-year-old male presents emergency with what appears to be gastritis.  That would explain why he has had pain on and off several weeks, and certainly why he feels the reflux coming back up into his throat which I do believe is with the wife means and clarifications by spitting up after meals.  Initially I was concerned for a possible gastral stomal tumor or a mass obstructing his stomach outflow, but based on his history and a CT scan gastritis with reflux certainly does seem to be the most likely diagnosis.  We talked about avoiding coffee, NSAIDs, and certain dietary changes he can make, and it does not seem like there is a clear antecedent event, apart from patient having had this diagnosis at least made in the ER about a year ago.  I do think that he is stable for outpatient management given his work-up here, and his vitals, I see no evidence of a GI bleed or of a perforated ulcer.  I do think is appropriate to give him Carafate, and PPI.  The next step in his management certainly is an endoscopy to confirm diagnosis, and I have put him in touch with Dr. Becker, and I believe that this gives the patient the best chance to be seen in the next 1 week for an endoscopy.  Wife is okay with this plan, understands very clear return to ED precautions, seems very motivated to participate in her 's care, and tells me that she knows that she needs to call and fill both prescriptions.    Diagnosis:    ICD-10-CM    1. Other gastritis without hemorrhage,  unspecified chronicity  K29.60       2. Gastroesophageal reflux disease, unspecified whether esophagitis present  K21.9           Discharge Medications:  Discharge Medication List as of 10/12/2022  5:10 PM          Scribe Disclosure:  I, JESSICA ARMSTRONG, am serving as a scribe at 4:33 PM on 10/12/2022 to document services personally performed by Nayan Coyle MD, based on my observations and the provider's statements to me.        Nayan Coyle MD  10/12/22 5365

## 2022-10-12 NOTE — DISCHARGE INSTRUCTIONS
As we discussed, your CT scan does show infection of your stomach, please take medication that we have given you, starting tonight.  Please follow-up with the provider, Dr. Garcia, at one of his 3 office locations, by calling tomorrow and asking for endoscopy.  Come back to the ER immediately with any other concerns you have.  Specifically come back with any vomiting that you cannot control at home, or with any other concerns you have, bleeding in your stool, worsening pain or fever or any other new symptoms that you have

## 2022-10-12 NOTE — RESULT ENCOUNTER NOTE
Won-    Adenomatous polyps are benign, but have malignant potential. This increases the risk of colon cancer and impacts the frequency of colonoscopy. Based on the updated polypectomy surveillance recommendations, individuals with 1-2 adenomas <10 mm should undergo surveillance colonoscopy in 7-10 years.    Recommendation: Surveillance colonoscopy in 7 years (2029).

## 2022-10-13 ENCOUNTER — PATIENT OUTREACH (OUTPATIENT)
Dept: FAMILY MEDICINE | Facility: CLINIC | Age: 52
End: 2022-10-13

## 2022-10-13 NOTE — TELEPHONE ENCOUNTER
What type of discharge? Emergency Department  Risk of Hospital admission or ED visit: 13%  Is a TCM episode required? Yes  When should the patient follow up with PCP? within 30 days of discharge.    Ashanti Santiago RN  [facility]

## 2022-10-13 NOTE — TELEPHONE ENCOUNTER
Patient Contact    Attempt # 1    Was call answered?  No.  Left message on voicemail on spouses phone with information to call triage back at [phone], option 2. Patient's number was busy.     On call back: hospital follow up  Olena Charlton RN

## 2022-10-14 ENCOUNTER — HOSPITAL ENCOUNTER (OUTPATIENT)
Dept: CT IMAGING | Facility: CLINIC | Age: 52
Discharge: HOME | End: 2022-10-14
Attending: FAMILY MEDICINE | Admitting: FAMILY MEDICINE
Payer: COMMERCIAL

## 2022-10-14 DIAGNOSIS — Z12.2 SCREENING FOR LUNG CANCER: ICD-10-CM

## 2022-10-14 DIAGNOSIS — Z87.891 HISTORY OF SMOKING 30 OR MORE PACK YEARS: ICD-10-CM

## 2022-10-14 PROCEDURE — 71271 CT THORAX LUNG CANCER SCR C-: CPT

## 2022-10-17 ENCOUNTER — HOSPITAL ENCOUNTER (OUTPATIENT)
Facility: CLINIC | Age: 52
Discharge: HOME | End: 2022-10-17
Attending: INTERNAL MEDICINE | Admitting: INTERNAL MEDICINE
Payer: COMMERCIAL

## 2022-10-17 VITALS
WEIGHT: 146 LBS | DIASTOLIC BLOOD PRESSURE: 84 MMHG | OXYGEN SATURATION: 98 % | HEIGHT: 66 IN | RESPIRATION RATE: 21 BRPM | SYSTOLIC BLOOD PRESSURE: 110 MMHG | BODY MASS INDEX: 23.46 KG/M2 | HEART RATE: 98 BPM

## 2022-10-17 LAB — UPPER GI ENDOSCOPY: NORMAL

## 2022-10-17 PROCEDURE — 999N000099 HC STATISTIC MODERATE SEDATION < 10 MIN: Performed by: INTERNAL MEDICINE

## 2022-10-17 PROCEDURE — 250N000011 HC RX IP 250 OP 636: Performed by: INTERNAL MEDICINE

## 2022-10-17 PROCEDURE — 43249 ESOPH EGD DILATION <30 MM: CPT | Performed by: INTERNAL MEDICINE

## 2022-10-17 PROCEDURE — 88305 TISSUE EXAM BY PATHOLOGIST: CPT | Mod: TC | Performed by: INTERNAL MEDICINE

## 2022-10-17 PROCEDURE — 43239 EGD BIOPSY SINGLE/MULTIPLE: CPT | Mod: XS | Performed by: INTERNAL MEDICINE

## 2022-10-17 PROCEDURE — 250N000009 HC RX 250: Performed by: INTERNAL MEDICINE

## 2022-10-17 PROCEDURE — 43245 EGD DILATE STRICTURE: CPT | Performed by: INTERNAL MEDICINE

## 2022-10-17 PROCEDURE — 88305 TISSUE EXAM BY PATHOLOGIST: CPT | Mod: 26 | Performed by: PATHOLOGY

## 2022-10-17 RX ORDER — FENTANYL CITRATE 50 UG/ML
INJECTION, SOLUTION INTRAMUSCULAR; INTRAVENOUS PRN
Status: DISCONTINUED | OUTPATIENT
Start: 2022-10-17 | End: 2022-10-17 | Stop reason: HOSPADM

## 2022-10-17 ASSESSMENT — ACTIVITIES OF DAILY LIVING (ADL): ADLS_ACUITY_SCORE: 35

## 2022-10-17 NOTE — H&P
[facility]  Pre-Endoscopy History and Physical     Kendall Agosto MRN# [id number]   YOB: 1970 Age: 52 year old     Date of Procedure: 10/17/2022  Primary care provider: Jovan Otero  Type of Endoscopy: EGD  Reason for Procedure: GERD, Dysphagia  Type of Anesthesia Anticipated: Moderate Sedation    HPI:    Gaudencio is a 52 year old male who will be undergoing the above procedure.      A history and physical has been performed. The patient's medications and allergies have been reviewed. The risks and benefits of the procedure and the sedation options and risks were discussed with the patient.  All questions were answered and informed consent was obtained.      He denies a personal or family history of anesthesia complications or bleeding disorders.     No Known Allergies     Prior to Admission Medications   Prescriptions Last Dose Informant Patient Reported? Taking?   omeprazole (PRILOSEC) 40 MG DR capsule Past Week  No Yes   Sig: Take 1 capsule (40 mg) by mouth daily   ondansetron (ZOFRAN ODT) 4 MG ODT tab Past Week  No Yes   Sig: Take 1 tablet (4 mg) by mouth every 8 hours as needed   sucralfate (CARAFATE) 1 GM tablet Past Week  No Yes   Sig: Take 1 tablet (1 g) by mouth 4 times daily      Facility-Administered Medications: None       There is no problem list on file for this patient.       History reviewed. No pertinent past medical history.     Past Surgical History:   Procedure Laterality Date     COLONOSCOPY N/A 10/6/2022    Procedure: COLONOSCOPY, WITH POLYPECTOMY AND BIOPSY;  Surgeon: Amrit Reyna MD;  Location:  GI       Social History     Tobacco Use     Smoking status: Every Day     Packs/day: 0.30     Years: 30.00     Pack years: 9.00     Types: Cigarettes     Smokeless tobacco: Never   Substance Use Topics     Alcohol use: Yes     Comment: very minimal use       History reviewed. No pertinent family history.    REVIEW OF SYSTEMS:     5 point ROS negative except as noted  "above in HPI, including Gen., Resp., CV, GI &  system review.      PHYSICAL EXAM:   /81   Pulse 86   Resp 19   Ht 1.676 m (5' 6\")   Wt 66.2 kg (146 lb)   SpO2 100%   BMI 23.57 kg/m   Estimated body mass index is 23.57 kg/m  as calculated from the following:    Height as of this encounter: 1.676 m (5' 6\").    Weight as of this encounter: 66.2 kg (146 lb).   GENERAL APPEARANCE: healthy, alert and no distress  MENTAL STATUS: alert  AIRWAY EXAM: Mallampatti Class I (visualization of the soft palate, fauces, uvula, anterior and posterior pillars)  RESP: lungs clear to auscultation - no rales, rhonchi or wheezes  CV: regular rates and rhythm      DIAGNOSTICS:    Not indicated      IMPRESSION   ASA Class 1 - Healthy patient, no medical problems        PLAN:       Plan for esophagogastroduodenoscopy (upper GI endoscopy). We discussed the risks, benefits and alternatives and the patient wished to proceed.    The above has been forwarded to the consulting provider.      Signed Electronically by: Noel Becker MD,MD  October 17, 2022      [facility]  Ph: [phone] Fax: [phone]    "

## 2023-03-06 ENCOUNTER — HOSPITAL ENCOUNTER (EMERGENCY)
Facility: CLINIC | Age: 53
Discharge: HOME | End: 2023-03-06
Attending: EMERGENCY MEDICINE | Admitting: EMERGENCY MEDICINE
Payer: COMMERCIAL

## 2023-03-06 VITALS
TEMPERATURE: 98.3 F | HEIGHT: 66 IN | DIASTOLIC BLOOD PRESSURE: 81 MMHG | HEART RATE: 93 BPM | WEIGHT: 148 LBS | BODY MASS INDEX: 23.78 KG/M2 | SYSTOLIC BLOOD PRESSURE: 160 MMHG | OXYGEN SATURATION: 99 %

## 2023-03-06 DIAGNOSIS — K29.00 OTHER ACUTE GASTRITIS WITHOUT HEMORRHAGE: ICD-10-CM

## 2023-03-06 LAB
ALBUMIN SERPL BCG-MCNC: 4.4 G/DL (ref 3.5–5.2)
ALP SERPL-CCNC: 133 U/L (ref 40–129)
ALT SERPL W P-5'-P-CCNC: 69 U/L (ref 10–50)
ANION GAP SERPL CALCULATED.3IONS-SCNC: 11 MMOL/L (ref 7–15)
AST SERPL W P-5'-P-CCNC: 26 U/L (ref 10–50)
ATRIAL RATE - MUSE: 71 BPM
BASOPHILS # BLD AUTO: 0 10E3/UL (ref 0–0.2)
BASOPHILS NFR BLD AUTO: 0 %
BILIRUB SERPL-MCNC: 0.5 MG/DL
BUN SERPL-MCNC: 9.3 MG/DL (ref 6–20)
CALCIUM SERPL-MCNC: 9.2 MG/DL (ref 8.6–10)
CHLORIDE SERPL-SCNC: 97 MMOL/L (ref 98–107)
CREAT SERPL-MCNC: 0.7 MG/DL (ref 0.67–1.17)
DEPRECATED HCO3 PLAS-SCNC: 28 MMOL/L (ref 22–29)
DIASTOLIC BLOOD PRESSURE - MUSE: NORMAL MMHG
EOSINOPHIL # BLD AUTO: 0.1 10E3/UL (ref 0–0.7)
EOSINOPHIL NFR BLD AUTO: 1 %
ERYTHROCYTE [DISTWIDTH] IN BLOOD BY AUTOMATED COUNT: 15.6 % (ref 10–15)
GFR SERPL CREATININE-BSD FRML MDRD: >90 ML/MIN/1.73M2
GLUCOSE SERPL-MCNC: 214 MG/DL (ref 70–99)
HCT VFR BLD AUTO: 45.2 % (ref 40–53)
HGB BLD-MCNC: 14.6 G/DL (ref 13.3–17.7)
IMM GRANULOCYTES # BLD: 0 10E3/UL
IMM GRANULOCYTES NFR BLD: 0 %
INTERPRETATION ECG - MUSE: NORMAL
LYMPHOCYTES # BLD AUTO: 2.1 10E3/UL (ref 0.8–5.3)
LYMPHOCYTES NFR BLD AUTO: 21 %
MCH RBC QN AUTO: 21.7 PG (ref 26.5–33)
MCHC RBC AUTO-ENTMCNC: 32.3 G/DL (ref 31.5–36.5)
MCV RBC AUTO: 67 FL (ref 78–100)
MONOCYTES # BLD AUTO: 0.9 10E3/UL (ref 0–1.3)
MONOCYTES NFR BLD AUTO: 9 %
NEUTROPHILS # BLD AUTO: 6.7 10E3/UL (ref 1.6–8.3)
NEUTROPHILS NFR BLD AUTO: 69 %
NRBC # BLD AUTO: 0 10E3/UL
NRBC BLD AUTO-RTO: 0 /100
P AXIS - MUSE: 74 DEGREES
PLATELET # BLD AUTO: 247 10E3/UL (ref 150–450)
POTASSIUM SERPL-SCNC: 4 MMOL/L (ref 3.4–5.3)
PR INTERVAL - MUSE: 148 MS
PROT SERPL-MCNC: 7.9 G/DL (ref 6.4–8.3)
QRS DURATION - MUSE: 78 MS
QT - MUSE: 364 MS
QTC - MUSE: 395 MS
R AXIS - MUSE: 62 DEGREES
RBC # BLD AUTO: 6.74 10E6/UL (ref 4.4–5.9)
SODIUM SERPL-SCNC: 136 MMOL/L (ref 136–145)
SYSTOLIC BLOOD PRESSURE - MUSE: NORMAL MMHG
T AXIS - MUSE: 53 DEGREES
TROPONIN T SERPL HS-MCNC: 7 NG/L
VENTRICULAR RATE- MUSE: 71 BPM
WBC # BLD AUTO: 9.8 10E3/UL (ref 4–11)

## 2023-03-06 PROCEDURE — 250N000009 HC RX 250: Performed by: EMERGENCY MEDICINE

## 2023-03-06 PROCEDURE — 93005 ELECTROCARDIOGRAM TRACING: CPT

## 2023-03-06 PROCEDURE — 96374 THER/PROPH/DIAG INJ IV PUSH: CPT

## 2023-03-06 PROCEDURE — 96375 TX/PRO/DX INJ NEW DRUG ADDON: CPT

## 2023-03-06 PROCEDURE — 80053 COMPREHEN METABOLIC PANEL: CPT | Performed by: EMERGENCY MEDICINE

## 2023-03-06 PROCEDURE — 250N000013 HC RX MED GY IP 250 OP 250 PS 637: Performed by: EMERGENCY MEDICINE

## 2023-03-06 PROCEDURE — 84484 ASSAY OF TROPONIN QUANT: CPT | Performed by: EMERGENCY MEDICINE

## 2023-03-06 PROCEDURE — C9113 INJ PANTOPRAZOLE SODIUM, VIA: HCPCS | Performed by: EMERGENCY MEDICINE

## 2023-03-06 PROCEDURE — 85025 COMPLETE CBC W/AUTO DIFF WBC: CPT | Performed by: EMERGENCY MEDICINE

## 2023-03-06 PROCEDURE — 36415 COLL VENOUS BLD VENIPUNCTURE: CPT | Performed by: EMERGENCY MEDICINE

## 2023-03-06 PROCEDURE — 250N000011 HC RX IP 250 OP 636: Performed by: EMERGENCY MEDICINE

## 2023-03-06 PROCEDURE — 99284 EMERGENCY DEPT VISIT MOD MDM: CPT | Mod: 25

## 2023-03-06 RX ORDER — ONDANSETRON 2 MG/ML
4 INJECTION INTRAMUSCULAR; INTRAVENOUS ONCE
Status: COMPLETED | OUTPATIENT
Start: 2023-03-06 | End: 2023-03-06

## 2023-03-06 RX ORDER — SUCRALFATE ORAL 1 G/10ML
1 SUSPENSION ORAL 4 TIMES DAILY
Qty: 560 ML | Refills: 0 | Status: SHIPPED | OUTPATIENT
Start: 2023-03-06 | End: 2023-03-20

## 2023-03-06 RX ADMIN — PANTOPRAZOLE SODIUM 40 MG: 40 INJECTION, POWDER, FOR SOLUTION INTRAVENOUS at 11:33

## 2023-03-06 RX ADMIN — ONDANSETRON 4 MG: 2 INJECTION INTRAMUSCULAR; INTRAVENOUS at 11:35

## 2023-03-06 RX ADMIN — LIDOCAINE HYDROCHLORIDE 30 ML: 20 SOLUTION ORAL; TOPICAL at 11:34

## 2023-03-06 ASSESSMENT — ACTIVITIES OF DAILY LIVING (ADL)
ADLS_ACUITY_SCORE: 35
ADLS_ACUITY_SCORE: 35

## 2023-03-06 ASSESSMENT — ENCOUNTER SYMPTOMS
COUGH: 0
ABDOMINAL PAIN: 1
FEVER: 0
VOMITING: 1
DIARRHEA: 0

## 2023-03-06 NOTE — ED PROVIDER NOTES
"  History     Chief Complaint:  Abdominal Pain     The history is provided by the patient. The history is limited by a language barrier. A  was used.      Kendall Agosto is a 53 year old male with a history of Gastritis and H pylori who presents with abdominal pain. The patient sees Dr. Becker in GI and had endoscopy Oct 2022, which showed Reflux Esophagitis.  He was placed on amoxicillin and clarithromycin in December of last year.  The patient reports three weeks of dull abdominal pain and constant pain for the past week. He reports that the pain worsened last night and that he vomited. He reports that he is unsure if this pain feels similar to his Gastritis flare ups but states that it feels similar to his H Pylori symptoms, aside from his new symptoms of muscle pains in is chest and reflux. He states that food improves his abdominal pain. He denies any fevers, cough, or diarrhea. He has not had any abdominal surgeries.    Independent Historian:   None - Patient Only    Review of External Notes: I reviewed the GI note from 10/17/23.    ROS:  Review of Systems   Constitutional: Negative for fever.   Respiratory: Negative for cough.    Gastrointestinal: Positive for abdominal pain and vomiting. Negative for diarrhea.   All other systems reviewed and are negative.    Allergies:  No Known Allergies     Medications:    Pantoprazole    Past Medical History:    Gastritis  H. Pylori    Past Surgical History:    Surgical history reviewed. No pertinent past medical history.      Social History:  Presents with female .   PCP: Jovan Otero     Physical Exam     Patient Vitals for the past 24 hrs:   BP Temp Temp src Pulse SpO2 Height Weight   03/06/23 0944 (!) 160/81 98.3  F (36.8  C) Oral 93 99 % 1.676 m (5' 6\") 67.1 kg (148 lb)      Physical Exam    Constitutional:  Patient is oriented to person, place, and time. They appear     well-developed and well-nourished. No distress.   HENT:   Mouth/Throat: "   Oropharynx is clear and moist.   Eyes:    Conjunctivae normal and EOM are normal. Pupils are equal, round, and reactive to light.   Neck:    Normal range of motion.   Cardiovascular: Normal rate, regular rhythm and normal heart sounds.  Exam reveals    no gallop and no friction rub.       No murmur heard.  Pulmonary/Chest:  Effort normal and breath sounds normal. Patient has no wheezes.    Patient has no rales.   Abdominal:   Epigastric pain. Soft. Bowel sounds are normal. Patient exhibits no    mass. There is epigastric tenderness. There is no rebound and no guarding.   Musculoskeletal:  Normal range of motion. Patient exhibits no edema.   Neurological:   Patient is alert and oriented to person, place, and time. Patient has    normal strength. No cranial nerve deficit or sensory deficit.     GCS 15  Skin:   Skin is warm and dry. No rash noted. No erythema.   Psychiatric:   Patient has a normal mood and affect. Patient's behavior is normal.    Judgment and thought content normal.     Emergency Department Course   ECG  ECG results from 03/06/23 @ 1049   EKG 12-lead, tracing only     Value    Systolic Blood Pressure     Diastolic Blood Pressure     Ventricular Rate 71    Atrial Rate 71    OR Interval 148    QRS Duration 78        QTc 395    P Axis 74    R AXIS 62    T Axis 53    Interpretation ECG      Sinus rhythm  Normal ECG  No significant changes compared to prior, dated 11/13/21  Read by me at 1057         Laboratory:  Labs Ordered and Resulted from Time of ED Arrival to Time of ED Departure   COMPREHENSIVE METABOLIC PANEL - Abnormal       Result Value    Sodium 136      Potassium 4.0      Chloride 97 (*)     Carbon Dioxide (CO2) 28      Anion Gap 11      Urea Nitrogen 9.3      Creatinine 0.70      Calcium 9.2      Glucose 214 (*)     Alkaline Phosphatase 133 (*)     AST 26      ALT 69 (*)     Protein Total 7.9      Albumin 4.4      Bilirubin Total 0.5      GFR Estimate >90     CBC WITH PLATELETS AND  DIFFERENTIAL - Abnormal    WBC Count 9.8      RBC Count 6.74 (*)     Hemoglobin 14.6      Hematocrit 45.2      MCV 67 (*)     MCH 21.7 (*)     MCHC 32.3      RDW 15.6 (*)     Platelet Count 247      % Neutrophils 69      % Lymphocytes 21      % Monocytes 9      % Eosinophils 1      % Basophils 0      % Immature Granulocytes 0      NRBCs per 100 WBC 0      Absolute Neutrophils 6.7      Absolute Lymphocytes 2.1      Absolute Monocytes 0.9      Absolute Eosinophils 0.1      Absolute Basophils 0.0      Absolute Immature Granulocytes 0.0      Absolute NRBCs 0.0     TROPONIN T, HIGH SENSITIVITY - Normal    Troponin T, High Sensitivity 7        Procedures       Emergency Department Course & Assessments:     Interventions:  Medications   pantoprazole (PROTONIX) IV push injection 40 mg (40 mg Intravenous $Given 3/6/23 1133)   lidocaine (viscous) (XYLOCAINE) 2 % 15 mL, alum & mag hydroxide-simethicone (MAALOX) 15 mL GI Cocktail (30 mLs Oral $Given 3/6/23 1134)   ondansetron (ZOFRAN) injection 4 mg (4 mg Intravenous $Given 3/6/23 1135)      Independent Interpretation (X-rays, CTs, rhythm strip):  None    Consultations/Discussion of Management or Tests:  1256 I spoke with Dr. Becker, GI, regarding the patient.     Assessments:  ED Course as of 03/06/23 1345   Mon Mar 06, 2023   1023 I obtained history and examined the patient as noted above.    1258 I rechecked the patient. Patient feels better after GI cocktail.      Social Determinants of Health affecting care:   None    Disposition:  The patient was discharged to home.     Impression & Plan    CMS Diagnoses: None      Medical Decision Making:  Gaudencio Agosto is a 53-year-old gentleman presenting with epigastric pain that is somewhat reminiscent of when he had H. pylori.  He was recent reproducibly tender in this area but not significantly so.  He had no focal right upper quadrant pain.  Blood work was obtained as noted above he has slight elevation of his liver function but this is  improved from previous.  He is not acutely anemic his white count is normal.  No concern for ACS, dissection, PE, intra-abdominal catastrophe.  He did have an imaging of his abdomen last year when he had recurrence I did review this there was inflammation around the stomach but no other acute findings.  I suspect that this is his gastritis.  I did speak with his gastroenterologist Dr. Eugenio valdez to determine best course of follow-up.  They will see him in the office this week and rescope him.  It was recommended to treat him with Carafate.  No antibiotics.    Diagnosis:    ICD-10-CM    1. Other acute gastritis without hemorrhage  K29.00            Discharge Medications:  Discharge Medication List as of 3/6/2023  1:11 PM      START taking these medications    Details   sucralfate (CARAFATE) 1 GM/10ML suspension Take 10 mLs (1 g) by mouth 4 times daily for 14 days, Disp-560 mL, R-0, E-Prescribe            Scribe Disclosure:  I, COY FRIAS, am serving as a scribe at 10:37 AM on 3/6/2023 to document services personally performed by Laura West MD based on my observations and the provider's statements to me.      3/6/2023   Laura West MD Bochert, Michelle Ann, MD  03/06/23 9111

## 2023-03-08 ENCOUNTER — TRANSFERRED RECORDS (OUTPATIENT)
Dept: HEALTH INFORMATION MANAGEMENT | Facility: CLINIC | Age: 53
End: 2023-03-08

## 2023-04-04 ENCOUNTER — TRANSFERRED RECORDS (OUTPATIENT)
Dept: HEALTH INFORMATION MANAGEMENT | Facility: CLINIC | Age: 53
End: 2023-04-04
Payer: COMMERCIAL

## 2023-10-21 ENCOUNTER — HEALTH MAINTENANCE LETTER (OUTPATIENT)
Age: 53
End: 2023-10-21

## 2024-12-14 ENCOUNTER — HEALTH MAINTENANCE LETTER (OUTPATIENT)
Age: 54
End: 2024-12-14

## 2025-04-07 ENCOUNTER — ANCILLARY PROCEDURE (OUTPATIENT)
Dept: GENERAL RADIOLOGY | Facility: CLINIC | Age: 55
End: 2025-04-07
Attending: FAMILY MEDICINE
Payer: COMMERCIAL

## 2025-04-07 ENCOUNTER — OFFICE VISIT (OUTPATIENT)
Dept: FAMILY MEDICINE | Facility: CLINIC | Age: 55
End: 2025-04-07
Payer: COMMERCIAL

## 2025-04-07 VITALS
BODY MASS INDEX: 23.07 KG/M2 | WEIGHT: 147 LBS | RESPIRATION RATE: 16 BRPM | SYSTOLIC BLOOD PRESSURE: 131 MMHG | TEMPERATURE: 97.7 F | OXYGEN SATURATION: 98 % | HEIGHT: 67 IN | HEART RATE: 109 BPM | DIASTOLIC BLOOD PRESSURE: 83 MMHG

## 2025-04-07 DIAGNOSIS — Z13.220 SCREENING CHOLESTEROL LEVEL: ICD-10-CM

## 2025-04-07 DIAGNOSIS — Z87.891 HISTORY OF TOBACCO USE, PRESENTING HAZARDS TO HEALTH: ICD-10-CM

## 2025-04-07 DIAGNOSIS — Z13.1 SCREENING FOR DIABETES MELLITUS: ICD-10-CM

## 2025-04-07 DIAGNOSIS — Z00.00 ENCOUNTER FOR ADULT WELLNESS VISIT: Primary | ICD-10-CM

## 2025-04-07 DIAGNOSIS — R73.9 HYPERGLYCEMIA: ICD-10-CM

## 2025-04-07 DIAGNOSIS — Z12.5 SCREENING FOR PROSTATE CANCER: ICD-10-CM

## 2025-04-07 DIAGNOSIS — M25.561 PAIN AND SWELLING OF RIGHT KNEE: ICD-10-CM

## 2025-04-07 DIAGNOSIS — M25.461 PAIN AND SWELLING OF RIGHT KNEE: ICD-10-CM

## 2025-04-07 DIAGNOSIS — M25.561 ACUTE PAIN OF RIGHT KNEE: ICD-10-CM

## 2025-04-07 DIAGNOSIS — Z98.890 HISTORY OF KNEE SURGERY: ICD-10-CM

## 2025-04-07 LAB
ALBUMIN SERPL BCG-MCNC: 4.1 G/DL (ref 3.5–5.2)
ALP SERPL-CCNC: 135 U/L (ref 40–150)
ALT SERPL W P-5'-P-CCNC: 49 U/L (ref 0–70)
ANION GAP SERPL CALCULATED.3IONS-SCNC: 13 MMOL/L (ref 7–15)
AST SERPL W P-5'-P-CCNC: 30 U/L (ref 0–45)
BILIRUB SERPL-MCNC: 0.4 MG/DL
BUN SERPL-MCNC: 13.6 MG/DL (ref 6–20)
CALCIUM SERPL-MCNC: 9.1 MG/DL (ref 8.8–10.4)
CHLORIDE SERPL-SCNC: 97 MMOL/L (ref 98–107)
CHOLEST SERPL-MCNC: 153 MG/DL
CREAT SERPL-MCNC: 0.65 MG/DL (ref 0.67–1.17)
EGFRCR SERPLBLD CKD-EPI 2021: >90 ML/MIN/1.73M2
ERYTHROCYTE [DISTWIDTH] IN BLOOD BY AUTOMATED COUNT: 14.2 % (ref 10–15)
FASTING STATUS PATIENT QL REPORTED: YES
FASTING STATUS PATIENT QL REPORTED: YES
GLUCOSE SERPL-MCNC: 252 MG/DL (ref 70–99)
HCO3 SERPL-SCNC: 24 MMOL/L (ref 22–29)
HCT VFR BLD AUTO: 44.6 % (ref 40–53)
HDLC SERPL-MCNC: 37 MG/DL
HGB BLD-MCNC: 14.1 G/DL (ref 13.3–17.7)
LDLC SERPL CALC-MCNC: 99 MG/DL
MCH RBC QN AUTO: 20.8 PG (ref 26.5–33)
MCHC RBC AUTO-ENTMCNC: 31.6 G/DL (ref 31.5–36.5)
MCV RBC AUTO: 66 FL (ref 78–100)
NONHDLC SERPL-MCNC: 116 MG/DL
PLATELET # BLD AUTO: 309 10E3/UL (ref 150–450)
POTASSIUM SERPL-SCNC: 4.2 MMOL/L (ref 3.4–5.3)
PROT SERPL-MCNC: 8.3 G/DL (ref 6.4–8.3)
PSA SERPL DL<=0.01 NG/ML-MCNC: 0.3 NG/ML (ref 0–3.5)
RBC # BLD AUTO: 6.79 10E6/UL (ref 4.4–5.9)
SODIUM SERPL-SCNC: 134 MMOL/L (ref 135–145)
TRIGL SERPL-MCNC: 85 MG/DL
URATE SERPL-MCNC: 2 MG/DL (ref 3.4–7)
WBC # BLD AUTO: 10.4 10E3/UL (ref 4–11)

## 2025-04-07 PROCEDURE — 99396 PREV VISIT EST AGE 40-64: CPT | Performed by: FAMILY MEDICINE

## 2025-04-07 PROCEDURE — 36415 COLL VENOUS BLD VENIPUNCTURE: CPT | Performed by: FAMILY MEDICINE

## 2025-04-07 PROCEDURE — G0103 PSA SCREENING: HCPCS | Performed by: FAMILY MEDICINE

## 2025-04-07 PROCEDURE — 73562 X-RAY EXAM OF KNEE 3: CPT | Mod: TC | Performed by: RADIOLOGY

## 2025-04-07 PROCEDURE — 99213 OFFICE O/P EST LOW 20 MIN: CPT | Mod: 25 | Performed by: FAMILY MEDICINE

## 2025-04-07 PROCEDURE — 3079F DIAST BP 80-89 MM HG: CPT | Performed by: FAMILY MEDICINE

## 2025-04-07 PROCEDURE — 85027 COMPLETE CBC AUTOMATED: CPT | Performed by: FAMILY MEDICINE

## 2025-04-07 PROCEDURE — 80061 LIPID PANEL: CPT | Performed by: FAMILY MEDICINE

## 2025-04-07 PROCEDURE — 84550 ASSAY OF BLOOD/URIC ACID: CPT | Performed by: FAMILY MEDICINE

## 2025-04-07 PROCEDURE — 1125F AMNT PAIN NOTED PAIN PRSNT: CPT | Performed by: FAMILY MEDICINE

## 2025-04-07 PROCEDURE — 80053 COMPREHEN METABOLIC PANEL: CPT | Performed by: FAMILY MEDICINE

## 2025-04-07 PROCEDURE — 83036 HEMOGLOBIN GLYCOSYLATED A1C: CPT | Performed by: FAMILY MEDICINE

## 2025-04-07 PROCEDURE — 3075F SYST BP GE 130 - 139MM HG: CPT | Performed by: FAMILY MEDICINE

## 2025-04-07 SDOH — HEALTH STABILITY: PHYSICAL HEALTH: ON AVERAGE, HOW MANY DAYS PER WEEK DO YOU ENGAGE IN MODERATE TO STRENUOUS EXERCISE (LIKE A BRISK WALK)?: 4 DAYS

## 2025-04-07 SDOH — HEALTH STABILITY: PHYSICAL HEALTH: ON AVERAGE, HOW MANY MINUTES DO YOU ENGAGE IN EXERCISE AT THIS LEVEL?: 10 MIN

## 2025-04-07 ASSESSMENT — PAIN SCALES - GENERAL: PAINLEVEL_OUTOF10: SEVERE PAIN (8)

## 2025-04-07 ASSESSMENT — SOCIAL DETERMINANTS OF HEALTH (SDOH): HOW OFTEN DO YOU GET TOGETHER WITH FRIENDS OR RELATIVES?: THREE TIMES A WEEK
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
## 2025-04-08 ENCOUNTER — PATIENT OUTREACH (OUTPATIENT)
Dept: CARE COORDINATION | Facility: CLINIC | Age: 55
End: 2025-04-08
Payer: COMMERCIAL

## 2025-04-08 ENCOUNTER — HOSPITAL ENCOUNTER (OUTPATIENT)
Dept: ULTRASOUND IMAGING | Facility: CLINIC | Age: 55
Discharge: HOME | End: 2025-04-08
Attending: FAMILY MEDICINE | Admitting: FAMILY MEDICINE
Payer: COMMERCIAL

## 2025-04-08 DIAGNOSIS — M25.561 PAIN AND SWELLING OF RIGHT KNEE: ICD-10-CM

## 2025-04-08 DIAGNOSIS — M25.461 PAIN AND SWELLING OF RIGHT KNEE: ICD-10-CM

## 2025-04-08 PROCEDURE — 93971 EXTREMITY STUDY: CPT | Mod: RT

## 2025-04-09 DIAGNOSIS — R73.9 HYPERGLYCEMIA: Primary | ICD-10-CM

## 2025-04-09 LAB
EST. AVERAGE GLUCOSE BLD GHB EST-MCNC: 301 MG/DL
HBA1C MFR BLD: 12.1 % (ref 0–5.6)

## 2025-04-10 ENCOUNTER — PATIENT OUTREACH (OUTPATIENT)
Dept: CARE COORDINATION | Facility: CLINIC | Age: 55
End: 2025-04-10
Payer: COMMERCIAL

## 2025-04-14 ENCOUNTER — TELEPHONE (OUTPATIENT)
Dept: FAMILY MEDICINE | Facility: CLINIC | Age: 55
End: 2025-04-14

## 2025-04-14 ENCOUNTER — VIRTUAL VISIT (OUTPATIENT)
Dept: FAMILY MEDICINE | Facility: CLINIC | Age: 55
End: 2025-04-14
Payer: COMMERCIAL

## 2025-04-14 DIAGNOSIS — E78.5 HYPERLIPIDEMIA LDL GOAL <70: ICD-10-CM

## 2025-04-14 DIAGNOSIS — M25.461 PAIN AND SWELLING OF RIGHT KNEE: ICD-10-CM

## 2025-04-14 DIAGNOSIS — M25.561 PAIN AND SWELLING OF RIGHT KNEE: ICD-10-CM

## 2025-04-14 DIAGNOSIS — E11.65 TYPE 2 DIABETES MELLITUS WITH HYPERGLYCEMIA, WITHOUT LONG-TERM CURRENT USE OF INSULIN (H): Primary | ICD-10-CM

## 2025-04-14 PROCEDURE — 98006 SYNCH AUDIO-VIDEO EST MOD 30: CPT | Performed by: FAMILY MEDICINE

## 2025-04-14 RX ORDER — METFORMIN HYDROCHLORIDE 500 MG/1
1000 TABLET, EXTENDED RELEASE ORAL
Qty: 180 TABLET | Refills: 1 | Status: SHIPPED | OUTPATIENT
Start: 2025-04-14

## 2025-04-14 RX ORDER — ATORVASTATIN CALCIUM 10 MG/1
10 TABLET, FILM COATED ORAL DAILY
Qty: 90 TABLET | Refills: 3 | Status: SHIPPED | OUTPATIENT
Start: 2025-04-14

## 2025-04-14 NOTE — TELEPHONE ENCOUNTER
DD,     Pt's wife called to inform care team she attempted to schedule Chest CT scan for pt but was informed there are no orders for aforementioned imaging on file.   Per chart review, CT Chest Lung was ordered on 04/07/25 but status shows it as canceled.   Imaging order pended for your review and approval if appropriate.   Please advise.     Thank you,   Jannet MCELROY RN

## 2025-04-14 NOTE — PROGRESS NOTES
Gaudencio is a 55 year old who is being evaluated via a billable video visit.          Assessment & Plan     Type 2 diabetes mellitus with hyperglycemia, without long-term current use of insulin (H)  I explained the pathophysiology of type 2 diabetes and why it is important to treat.  We discussed treatment options and he was given a prescription for metformin.  He is going to take 500 mg daily for the first week, then increase to 1000 mg daily.  I also put in a referral for him to see diabetes educator.  He is going to schedule follow-up visit to see me in 3 months.  - metFORMIN (GLUCOPHAGE XR) 500 MG 24 hr tablet; Take 2 tablets (1,000 mg) by mouth daily (with dinner).  - atorvastatin (LIPITOR) 10 MG tablet; Take 1 tablet (10 mg) by mouth daily.  - Adult Diabetes Education  Referral; Future    Hyperlipidemia LDL goal <70  We discussed treatment options and I recommended starting atorvastatin 10 mg daily.  We discussed dietary/lifestyle modifications that can be helpful.    Pain and swelling of right knee  I recommended he get into see an orthopedic specialist about the ongoing issues he is struggling with regarding pain and swelling of the right knee.  X-rays from 4/7/2025 showed an old healed fracture deformity in the distal femoral shaft, but no acute fractures or effusions.      The longitudinal plan of care for the diagnosis(es)/condition(s) as documented were addressed during this visit. Due to the added complexity in care, I will continue to support Gaudencio in the subsequent management and with ongoing continuity of care.          Subjective   Gaudencio is a 55 year old, presenting for the following health issues:  No chief complaint on file.    ОЛЬГА Perdue is a 55-year-old gentleman who is scheduled for a virtual follow-up visit after recent physical exam on/7/25 that showed elevated glucose levels of 252.  His subsequent hemoglobin A1c was high at 12.1%.  The LDL was 99.  This is a new diagnosis of  diabetes for him.  He reports that his mother has a history of diabetes.  He has never taken medication for this.    Review of Systems  Constitutional, HEENT, cardiovascular, pulmonary, GI, , musculoskeletal, neuro, skin, endocrine and psych systems are negative, except as otherwise noted.      Objective           Vitals:  No vitals were obtained today due to virtual visit.    Physical Exam   GENERAL: alert and no distress  EYES: Eyes grossly normal to inspection.  No discharge or erythema, or obvious scleral/conjunctival abnormalities.  RESP: No audible wheeze, cough, or visible cyanosis.    SKIN: Visible skin clear. No significant rash, abnormal pigmentation or lesions.  NEURO: Cranial nerves grossly intact.  Mentation and speech appropriate for age.  PSYCH: Appropriate affect, tone, and pace of words          Video-Visit Details    Type of service:  Video Visit   Originating Location (pt. Location): Home    Distant Location (provider location):  On-site  Platform used for Video Visit: Salazar  Signed Electronically by: Guilherme Barrera DO

## 2025-04-14 NOTE — PROGRESS NOTES
"Gaudencio is a 55 year old who is being evaluated via a billable video visit.    {ROOMING STAFF complete during rooming of virtual visit (Optional):279971}  {If patient encounters technical issues they should call [phone] :003890}    {PROVIDER CHARTING PREFERENCE:292617}    Subjective   Gaudencio is a 55 year old, presenting for the following health issues:  No chief complaint on file.  {(!) Visit Details have not yet been documented.  Please enter Visit Details and then use this list to pull in documentation. (Optional):781242}  History of Present Illness       Reason for visit:  Follow up last appointment with leg injury    He eats 2-3 servings of fruits and vegetables daily.He consumes 0 sweetened beverage(s) daily.He exercises with enough effort to increase his heart rate 9 or less minutes per day.  He exercises with enough effort to increase his heart rate 3 or less days per week.   He is taking medications regularly.        {SUPERLIST (Optional):488887}  {additonal problems for provider to add (Optional):985029}    {ROS Picklists (Optional):928209}      Objective           Vitals:  No vitals were obtained today due to virtual visit.    Physical Exam   {video visit exam brief selected:344451}    {Diagnostic Test Results (Optional):592027}      Video-Visit Details    Type of service:  Video Visit   Originating Location (pt. Location): {video visit patient location:822444::\"Home\"}  {PROVIDER LOCATION On-site should be selected for visits conducted from your clinic location or adjoining [facility] hospital, academic office, or other nearby [facility] building. Off-site should be selected for all other provider locations, including home:965710}  Distant Location (provider location):  {virtual location provider:224329}  Platform used for Video Visit: {Virtual Visit Platforms:866336::\"Yesmail\"}  Signed Electronically by: Guilherme Barrera DO  {Email feedback regarding this note to [email or web address]   " :472244}

## 2025-04-14 NOTE — PROGRESS NOTES
ASSESSMENT & PLAN    Gaudencio was seen today for pain.    Diagnoses and all orders for this visit:    Hamstring tendinitis of right thigh  -     Orthopedic  Referral  -     Physical Therapy  Referral; Future    Hamstring strain, right, initial encounter  -     Physical Therapy  Referral; Future      This issue is acute and Unchanged. Gaudencio presents our clinic today to discuss his acute right thigh pain.  He reports pain in the posterior aspect of the distal thigh that is worse with standing for prolonged periods of time.  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  On examination, he is tender to palpation to the hamstring musculature and tendon.  He has limitations in active and passive range of motion due to his deformity of the femur, but does not appear to have any meniscus pain and does not have any ligamentous laxity.  Overall, it appears his symptoms are due to his hamstring muscle and insertional tendinitis.  We discussed these findings and the need for dedicated physical therapy to help strengthen the hamstring muscle and its tendon.  We determine the following plan:  - Physical therapy referral placed  - He can otherwise utilize over-the-counter pain medicines, ice, heat as needed  - He can follow-up with me in 6 to 8 weeks if not improving for further evaluation and treatment, at that time could trial a CSI to the knee joint versus obtaining advanced imaging of the knee.    The longitudinal plan of care for the diagnosis(es)/condition(s) as documented were addressed during this visit. Due to the added complexity in care, I will continue to support Gaudencio in the subsequent management and with ongoing continuity of care.        Jose Juan Humphreys [facility]  [place]    -----  Chief Complaint   Patient presents with    Right Knee - Pain       SUBJECTIVE  Kendall Agosto is a/an 55 year old male who is seen in consultation at the request of  Guilherme Barrera D.O. for evaluation of right knee.     The patient is seen with their wife.      Onset: 1 months(s) ago Patient describes insidious onset  Location of Pain: right knee posterior lateral and down to foot  Worsened by: standing   Better with: pain patch and hot oil  Treatments tried: ibuprofen, previous imaging (xray 4/07/25), and pain patch and hot oil  Associated symptoms: swelling,numbness    Orthopedic/Surgical history: YES - Date: 25 years ago surgery on right knee  Social History/Occupation: unemployed      REVIEW OF SYSTEMS:  Review of systems negative unless mentioned in HPI     OBJECTIVE:  There were no vitals taken for this visit.   General: healthy, alert and in no distress  Skin: no suspicious lesions or rash.  CV: distal perfusion intact   Resp: normal respiratory effort without conversational dyspnea   Psych: normal mood and affect  Gait: NORMAL  Neuro: Normal light sensory exam of RL extremity     Right Knee exam  Gait: Normal  Alignment:  [x] Normal  [] Anatomic valgus  [] Anatomic varus  Inspection: [x] Normal  [] Ecchymosis present []Other   Palpation:  Joint Tenderness: [] No Tenderness  [] MJL [] LJL [] MCL Margin [] LCL Margin [] Pes Anserine [] Distal Quadricep  [x] Other- distal hamstring muscle belly, tendon   Peripatellar Tenderness: [x] None [] Lateral pole [] Medial pole [] Superior pole [] Inferior pole    Patellar tendon pain: [x] None [] Present    Patellar apprehension: [x] None [] Present    Patellar motion: [x] Normal [] Abnormal  Crepitus: [x] None [] Present  Effusion: [x] None [] Trace [] 1+ [] 2+ [] 3+  Range of motion:  Flexion: 135, Extension: 0  Strength:  [x] Full in all planes, including intact extensor mechanism [] Limited as described  Neurologic: Normal sensation    Vascular: Normal pulses   Special tests:   Lachman: Negative  Anterior Drawer: Negative  Sag/quad Activation: NP  Posterior Drawer: Negative  Valgus Stress: Negative at 0/30  Varus Stress: Negative at 0/30  Yefri's: Negative  Thessaly: NP     Other notable findings/comments: None       RADIOLOGY:  Final results and radiologist's interpretation, available in the [facility] health record.  Images were reviewed with the patient in the office today.  My personal interpretation of the performed imaging: Deformity of the distal femur consistent injury.  Moderate joint space narrowing, moderate to severe joint space narrowing of the patellofemoral compartment.  No acute abnormalities

## 2025-04-15 ENCOUNTER — OFFICE VISIT (OUTPATIENT)
Dept: ORTHOPEDICS | Facility: CLINIC | Age: 55
End: 2025-04-15
Attending: FAMILY MEDICINE
Payer: COMMERCIAL

## 2025-04-15 DIAGNOSIS — M76.891 HAMSTRING TENDINITIS OF RIGHT THIGH: Primary | ICD-10-CM

## 2025-04-15 DIAGNOSIS — S76.311A HAMSTRING STRAIN, RIGHT, INITIAL ENCOUNTER: ICD-10-CM

## 2025-04-15 PROCEDURE — G2211 COMPLEX E/M VISIT ADD ON: HCPCS | Performed by: STUDENT IN AN ORGANIZED HEALTH CARE EDUCATION/TRAINING PROGRAM

## 2025-04-15 PROCEDURE — 99203 OFFICE O/P NEW LOW 30 MIN: CPT | Performed by: STUDENT IN AN ORGANIZED HEALTH CARE EDUCATION/TRAINING PROGRAM

## 2025-04-15 NOTE — LETTER
4/15/2025      Kendall Agosto  [address]  [place] [address]      Dear Colleague,    Thank you for referring your patient, Kendall Agosto, to the [facility] [place]. Please see a copy of my visit note below.    ASSESSMENT & PLAN    Gaudencio was seen today for pain.    Diagnoses and all orders for this visit:    Hamstring tendinitis of right thigh  -     Orthopedic  Referral  -     Physical Therapy  Referral; Future    Hamstring strain, right, initial encounter  -     Physical Therapy  Referral; Future      This issue is acute and Unchanged. Gaudencio presents our clinic today to discuss his acute right thigh pain.  He reports pain in the posterior aspect of the distal thigh that is worse with standing for prolonged periods of time.  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  On examination, he is tender to palpation to the hamstring musculature and tendon.  He has limitations in active and passive range of motion due to his deformity of the femur, but does not appear to have any meniscus pain and does not have any ligamentous laxity.  Overall, it appears his symptoms are due to his hamstring muscle and insertional tendinitis.  We discussed these findings and the need for dedicated physical therapy to help strengthen the hamstring muscle and its tendon.  We determine the following plan:  - Physical therapy referral placed  - He can otherwise utilize over-the-counter pain medicines, ice, heat as needed  - He can follow-up with me in 6 to 8 weeks if not improving for further evaluation and treatment, at that time could trial a CSI to the knee joint versus obtaining advanced imaging of the knee.    The longitudinal plan of care for the diagnosis(es)/condition(s) as  documented were addressed during this visit. Due to the added complexity in care, I will continue to support Gaudencio in the subsequent management and with ongoing continuity of care.        Jose Juan Humphreys DO  [facility] [place]    -----  Chief Complaint   Patient presents with     Right Knee - Pain       SUBJECTIVE  Kendall Agosto is a/an 55 year old male who is seen in consultation at the request of  Guilherme Barrera D.O. for evaluation of right knee.     The patient is seen with their wife.      Onset: 1 months(s) ago Patient describes insidious onset  Location of Pain: right knee posterior lateral and down to foot  Worsened by: standing   Better with: pain patch and hot oil  Treatments tried: ibuprofen, previous imaging (xray 4/07/25), and pain patch and hot oil  Associated symptoms: swelling,numbness    Orthopedic/Surgical history: YES - Date: 25 years ago surgery on right knee  Social History/Occupation: unemployed      REVIEW OF SYSTEMS:  Review of systems negative unless mentioned in HPI     OBJECTIVE:  There were no vitals taken for this visit.   General: healthy, alert and in no distress  Skin: no suspicious lesions or rash.  CV: distal perfusion intact   Resp: normal respiratory effort without conversational dyspnea   Psych: normal mood and affect  Gait: NORMAL  Neuro: Normal light sensory exam of RL extremity     Right Knee exam  Gait: Normal  Alignment:  [x] Normal  [] Anatomic valgus  [] Anatomic varus  Inspection: [x] Normal  [] Ecchymosis present []Other   Palpation:  Joint Tenderness: [] No Tenderness  [] MJL [] LJL [] MCL Margin [] LCL Margin [] Pes Anserine [] Distal Quadricep  [x] Other- distal hamstring muscle belly, tendon   Peripatellar Tenderness: [x] None [] Lateral pole [] Medial pole [] Superior pole [] Inferior pole    Patellar tendon pain: [x] None [] Present    Patellar apprehension: [x] None [] Present    Patellar motion: [x] Normal [] Abnormal  Crepitus:  [x] None [] Present  Effusion: [x] None [] Trace [] 1+ [] 2+ [] 3+  Range of motion:  Flexion: 135, Extension: 0  Strength:  [x] Full in all planes, including intact extensor mechanism [] Limited as described  Neurologic: Normal sensation   Vascular: Normal pulses   Special tests:   Lachman: Negative  Anterior Drawer: Negative  Sag/quad Activation: NP  Posterior Drawer: Negative  Valgus Stress: Negative at 0/30  Varus Stress: Negative at 0/30  Yefri's: Negative  Thessaly: NP     Other notable findings/comments: None       RADIOLOGY:  Final results and radiologist's interpretation, available in the [facility] health record.  Images were reviewed with the patient in the office today.  My personal interpretation of the performed imaging: Deformity of the distal femur consistent injury.  Moderate joint space narrowing, moderate to severe joint space narrowing of the patellofemoral compartment.  No acute abnormalities      Again, thank you for allowing me to participate in the care of your patient.        Sincerely,        Jose Juan Humphreys, DO    Electronically signed

## 2025-04-15 NOTE — TELEPHONE ENCOUNTER
I spoke with the patient and family about this.  He does not meet the criteria for low-dose CT lung cancer screening since he has a 9-pack-year smoking history, not greater than 20-year.  I encouraged him to quit smoking and we will continue to monitor this in the future.  They expressed understanding. -DE

## 2025-07-20 ENCOUNTER — HEALTH MAINTENANCE LETTER (OUTPATIENT)
Age: 55
End: 2025-07-20

## 2025-07-22 DIAGNOSIS — E11.65 TYPE 2 DIABETES MELLITUS WITH HYPERGLYCEMIA, WITHOUT LONG-TERM CURRENT USE OF INSULIN (H): ICD-10-CM

## 2025-07-22 RX ORDER — METFORMIN HYDROCHLORIDE 500 MG/1
1000 TABLET, EXTENDED RELEASE ORAL
Qty: 180 TABLET | Refills: 1 | OUTPATIENT
Start: 2025-07-22

## (undated) RX ORDER — FENTANYL CITRATE 50 UG/ML
INJECTION, SOLUTION INTRAMUSCULAR; INTRAVENOUS
Status: DISPENSED
Start: 2022-10-06

## (undated) RX ORDER — FENTANYL CITRATE 50 UG/ML
INJECTION, SOLUTION INTRAMUSCULAR; INTRAVENOUS
Status: DISPENSED
Start: 2022-10-17